# Patient Record
Sex: MALE | Race: BLACK OR AFRICAN AMERICAN | Employment: UNEMPLOYED | URBAN - METROPOLITAN AREA
[De-identification: names, ages, dates, MRNs, and addresses within clinical notes are randomized per-mention and may not be internally consistent; named-entity substitution may affect disease eponyms.]

---

## 2018-08-02 ENCOUNTER — HOSPITAL ENCOUNTER (EMERGENCY)
Age: 28
Discharge: HOME | End: 2018-08-02
Attending: EMERGENCY MEDICINE
Payer: SELF-PAY

## 2018-08-02 VITALS
DIASTOLIC BLOOD PRESSURE: 81 MMHG | WEIGHT: 193 LBS | OXYGEN SATURATION: 96 % | RESPIRATION RATE: 12 BRPM | HEART RATE: 74 BPM | BODY MASS INDEX: 28.58 KG/M2 | TEMPERATURE: 97.9 F | SYSTOLIC BLOOD PRESSURE: 133 MMHG | HEIGHT: 69 IN

## 2018-08-02 DIAGNOSIS — N34.2 URETHRITIS: Primary | ICD-10-CM

## 2018-08-02 PROCEDURE — 99283 EMERGENCY DEPT VISIT LOW MDM: CPT

## 2018-08-02 PROCEDURE — 87491 CHLMYD TRACH DNA AMP PROBE: CPT | Performed by: EMERGENCY MEDICINE

## 2018-08-02 PROCEDURE — 74011250636 HC RX REV CODE- 250/636: Performed by: EMERGENCY MEDICINE

## 2018-08-02 PROCEDURE — 96372 THER/PROPH/DIAG INJ SC/IM: CPT

## 2018-08-02 PROCEDURE — 74011250637 HC RX REV CODE- 250/637: Performed by: EMERGENCY MEDICINE

## 2018-08-02 RX ORDER — AZITHROMYCIN 500 MG/1
1000 TABLET, FILM COATED ORAL
Status: COMPLETED | OUTPATIENT
Start: 2018-08-02 | End: 2018-08-02

## 2018-08-02 RX ADMIN — LIDOCAINE HYDROCHLORIDE 1 G: 10 INJECTION, SOLUTION EPIDURAL; INFILTRATION; INTRACAUDAL; PERINEURAL at 23:31

## 2018-08-02 RX ADMIN — AZITHROMYCIN 1000 MG: 500 TABLET, FILM COATED ORAL at 23:31
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
## 2018-08-03 NOTE — ED NOTES
Discharge instructions were given to the patient by Adama Lee RN. The patient left the Emergency Department ambulatory, alert and oriented and in no acute distress with 0 prescriptions. The patient was encouraged to call or return to the ED for worsening issues or problems and was encouraged to schedule a follow up appointment for continuing care. The patient verbalized understanding of discharge instructions and prescriptions, all questions were answered. The patient has no further concerns at this time.

## 2018-08-03 NOTE — DISCHARGE INSTRUCTIONS
Urethritis: Care Instructions  Your Care Instructions    Urethritis is an infection of the tube that takes urine from the bladder to the outside of the body. This tube is called the urethra. The infection is often caused by bacteria. This can happen if you have a sexually transmitted infection (STI). But a virus may also be a cause. Urethritis is usually treated with antibiotics. Most cases clear up with treatment. Proper treatment is very important. If you don't treat it, the infection can lead to lasting damage of the urethra. Other parts of the urinary system can also be damaged. Follow-up care is a key part of your treatment and safety. Be sure to make and go to all appointments, and call your doctor if you are having problems. It's also a good idea to know your test results and keep a list of the medicines you take. How can you care for yourself at home? · If your doctor prescribed antibiotics, take them as directed. Do not stop taking them just because you feel better. You need to take the full course of antibiotics. · Take an over-the-counter pain medicine, such as acetaminophen (Tylenol), ibuprofen (Advil, Motrin), or naproxen (Aleve), if needed. Be safe with medicines. Read and follow all instructions on the label. · Do not take two or more pain medicines at the same time unless the doctor told you to. Many pain medicines have acetaminophen, which is Tylenol. Too much acetaminophen (Tylenol) can be harmful. · Your doctor may have you take phenazopyridine (Pyridium). This is a pain medicine for the urinary tract. It can turn your urine a deep red-orange. This is normal. Call your doctor if you think you are having a problem with your medicine. · Do not have sex until you are done with treatment. If you do have sex, be sure to use a condom. Your sex partner or partners should be tested too if your urethritis was caused by an STI.   · If your infection was caused by an injury or chemicals, avoid those things if you can. When should you call for help? Call your doctor now or seek immediate medical care if:    · You can't urinate.     · You have symptoms of a urinary infection. For example:  ¨ You have blood or pus in your urine. ¨ You have pain in your back just below your rib cage. This is called flank pain. ¨ You have a fever, chills, or body aches. ¨ It hurts to urinate. ¨ You have groin or belly pain.     · You have a hard time urinating when your bladder is full.     · You notice mental changes or feel confused.    Watch closely for changes in your health, and be sure to contact your doctor if:    · You do not get better as expected. Where can you learn more? Go to http://aminata-kenneth.info/. Enter M462 in the search box to learn more about \"Urethritis: Care Instructions. \"  Current as of: May 12, 2017  Content Version: 11.7  © 7418-0287 [employer]. Care instructions adapted under license by [employer] (which disclaims liability or warranty for this information). If you have questions about a medical condition or this instruction, always ask your healthcare professional. [address] any warranty or liability for your use of this information.
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
## 2018-08-06 LAB
C TRACH DNA SPEC QL NAA+PROBE: NEGATIVE
N GONORRHOEA DNA SPEC QL NAA+PROBE: POSITIVE
SAMPLE TYPE: ABNORMAL
SERVICE CMNT-IMP: ABNORMAL
SPECIMEN SOURCE: ABNORMAL

## 2018-08-31 ENCOUNTER — APPOINTMENT (OUTPATIENT)
Dept: GENERAL RADIOLOGY | Age: 28
End: 2018-08-31
Attending: PHYSICIAN ASSISTANT
Payer: SELF-PAY

## 2018-08-31 ENCOUNTER — HOSPITAL ENCOUNTER (EMERGENCY)
Age: 28
Discharge: HOME | End: 2018-08-31
Attending: EMERGENCY MEDICINE
Payer: SELF-PAY

## 2018-08-31 VITALS
WEIGHT: 193 LBS | RESPIRATION RATE: 17 BRPM | SYSTOLIC BLOOD PRESSURE: 135 MMHG | TEMPERATURE: 98.5 F | HEIGHT: 69 IN | DIASTOLIC BLOOD PRESSURE: 82 MMHG | BODY MASS INDEX: 28.58 KG/M2 | HEART RATE: 94 BPM | OXYGEN SATURATION: 97 %
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
## 2018-08-31 DIAGNOSIS — T30.0 BURN: Primary | ICD-10-CM

## 2018-08-31 PROCEDURE — 74011000250 HC RX REV CODE- 250: Performed by: PHYSICIAN ASSISTANT

## 2018-08-31 PROCEDURE — 73590 X-RAY EXAM OF LOWER LEG: CPT

## 2018-08-31 PROCEDURE — 99283 EMERGENCY DEPT VISIT LOW MDM: CPT

## 2018-08-31 RX ORDER — BACITRACIN 500 UNIT/G
1 PACKET (EA) TOPICAL
Status: COMPLETED | OUTPATIENT
Start: 2018-08-31 | End: 2018-08-31

## 2018-08-31 RX ORDER — MUPIROCIN 20 MG/G
OINTMENT TOPICAL 3 TIMES DAILY
Qty: 22 G | Refills: 0 | Status: SHIPPED | OUTPATIENT
Start: 2018-08-31 | End: 2018-09-10

## 2018-08-31 RX ORDER — CEPHALEXIN 500 MG/1
500 CAPSULE ORAL 3 TIMES DAILY
Qty: 21 CAP | Refills: 0 | Status: SHIPPED | OUTPATIENT
Start: 2018-08-31 | End: 2018-09-07

## 2018-08-31 RX ADMIN — BACITRACIN 1 PACKET: 500 OINTMENT TOPICAL at 16:17
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
## 2018-10-07 ENCOUNTER — HOSPITAL ENCOUNTER (EMERGENCY)
Age: 28
Discharge: HOME | End: 2018-10-07
Attending: EMERGENCY MEDICINE | Admitting: EMERGENCY MEDICINE
Payer: SELF-PAY

## 2018-10-07 ENCOUNTER — APPOINTMENT (OUTPATIENT)
Dept: GENERAL RADIOLOGY | Age: 28
End: 2018-10-07
Payer: SELF-PAY

## 2018-10-07 VITALS
BODY MASS INDEX: 28.14 KG/M2 | RESPIRATION RATE: 18 BRPM | DIASTOLIC BLOOD PRESSURE: 77 MMHG | WEIGHT: 190 LBS | HEART RATE: 99 BPM | OXYGEN SATURATION: 97 % | TEMPERATURE: 98.3 F | SYSTOLIC BLOOD PRESSURE: 130 MMHG | HEIGHT: 69 IN
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
## 2018-10-07 DIAGNOSIS — S80.11XA CONTUSION OF RIGHT LOWER EXTREMITY, INITIAL ENCOUNTER: ICD-10-CM

## 2018-10-07 DIAGNOSIS — S89.91XA INJURY OF RIGHT LOWER EXTREMITY, INITIAL ENCOUNTER: ICD-10-CM

## 2018-10-07 PROCEDURE — 99283 EMERGENCY DEPT VISIT LOW MDM: CPT

## 2018-10-07 PROCEDURE — 73590 X-RAY EXAM OF LOWER LEG: CPT

## 2018-10-07 PROCEDURE — 73610 X-RAY EXAM OF ANKLE: CPT

## 2018-10-07 PROCEDURE — 74011250637 HC RX REV CODE- 250/637: Performed by: PHYSICIAN ASSISTANT

## 2018-10-07 RX ORDER — ACETAMINOPHEN 500 MG
1000 TABLET ORAL
Status: COMPLETED | OUTPATIENT
Start: 2018-10-07 | End: 2018-10-07

## 2018-10-07 RX ORDER — TRAMADOL HYDROCHLORIDE 50 MG/1
50 TABLET ORAL
Qty: 12 TAB | Refills: 0 | Status: SHIPPED | OUTPATIENT
Start: 2018-10-07 | End: 2018-10-23

## 2018-10-07 RX ADMIN — ACETAMINOPHEN 1000 MG: 500 TABLET, FILM COATED ORAL at 20:01
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
## 2018-10-08 NOTE — ED NOTES
Discharge instructions were given to the patient by Hilda Pryor.  
 
The patient left the Emergency Department ambulatory, alert and oriented and in no acute distress with 1 prescription. The patient was encouraged to call or return to the ED for worsening issues or problems and was encouraged to schedule a follow up appointment for continuing care. The patient verbalized understanding of discharge instructions and prescriptions, all questions were answered. The patient has no further concerns at this time.

## 2018-10-08 NOTE — ED NOTES
This RN assumes role as preceptor to Danisha Owen RN. This RN reviews all assessments, charting, medication administration, and skills performed by the preceptee.

## 2018-10-08 NOTE — DISCHARGE INSTRUCTIONS

## 2018-10-21 ENCOUNTER — APPOINTMENT (OUTPATIENT)
Dept: GENERAL RADIOLOGY | Age: 28
End: 2018-10-21
Attending: EMERGENCY MEDICINE
Payer: SELF-PAY

## 2018-10-21 ENCOUNTER — HOSPITAL ENCOUNTER (EMERGENCY)
Age: 28
Discharge: HOME | End: 2018-10-21
Attending: EMERGENCY MEDICINE
Payer: SELF-PAY

## 2018-10-21 VITALS
RESPIRATION RATE: 14 BRPM | SYSTOLIC BLOOD PRESSURE: 156 MMHG | BODY MASS INDEX: 28.14 KG/M2 | HEART RATE: 74 BPM | WEIGHT: 190 LBS | DIASTOLIC BLOOD PRESSURE: 91 MMHG | OXYGEN SATURATION: 98 % | HEIGHT: 69 IN | TEMPERATURE: 97.7 F

## 2018-10-21 DIAGNOSIS — S80.11XA LEG HEMATOMA, RIGHT, INITIAL ENCOUNTER: Primary | ICD-10-CM

## 2018-10-21 PROCEDURE — 74011250637 HC RX REV CODE- 250/637: Performed by: EMERGENCY MEDICINE

## 2018-10-21 PROCEDURE — 75810000289 HC I&D ABSCESS SIMP/COMP/MULT

## 2018-10-21 PROCEDURE — 99283 EMERGENCY DEPT VISIT LOW MDM: CPT

## 2018-10-21 PROCEDURE — 73590 X-RAY EXAM OF LOWER LEG: CPT

## 2018-10-21 PROCEDURE — 74011250636 HC RX REV CODE- 250/636: Performed by: EMERGENCY MEDICINE

## 2018-10-21 PROCEDURE — 90471 IMMUNIZATION ADMIN: CPT

## 2018-10-21 PROCEDURE — 90714 TD VACC NO PRESV 7 YRS+ IM: CPT | Performed by: EMERGENCY MEDICINE

## 2018-10-21 RX ORDER — TRAMADOL HYDROCHLORIDE 50 MG/1
50 TABLET ORAL
Status: COMPLETED | OUTPATIENT
Start: 2018-10-21 | End: 2018-10-21

## 2018-10-21 RX ORDER — SULFAMETHOXAZOLE AND TRIMETHOPRIM 800; 160 MG/1; MG/1
1 TABLET ORAL 2 TIMES DAILY
Qty: 14 TAB | Refills: 0 | Status: SHIPPED | OUTPATIENT
Start: 2018-10-21 | End: 2018-10-28

## 2018-10-21 RX ORDER — SULFAMETHOXAZOLE AND TRIMETHOPRIM 800; 160 MG/1; MG/1
1 TABLET ORAL
Status: COMPLETED | OUTPATIENT
Start: 2018-10-21 | End: 2018-10-21

## 2018-10-21 RX ORDER — LIDOCAINE HYDROCHLORIDE 10 MG/ML
3 INJECTION, SOLUTION EPIDURAL; INFILTRATION; INTRACAUDAL; PERINEURAL ONCE
Status: COMPLETED | OUTPATIENT
Start: 2018-10-21 | End: 2018-10-21

## 2018-10-21 RX ORDER — HYDROCODONE BITARTRATE AND ACETAMINOPHEN 5; 325 MG/1; MG/1
1 TABLET ORAL
Qty: 20 TAB | Refills: 0 | Status: SHIPPED | OUTPATIENT
Start: 2018-10-21 | End: 2018-10-23

## 2018-10-21 RX ADMIN — TRAMADOL HYDROCHLORIDE 50 MG: 50 TABLET, FILM COATED ORAL at 08:25

## 2018-10-21 RX ADMIN — CLOSTRIDIUM TETANI TOXOID ANTIGEN (FORMALDEHYDE INACTIVATED) AND CORYNEBACTERIUM DIPHTHERIAE TOXOID ANTIGEN (FORMALDEHYDE INACTIVATED) 0.5 ML: 5; 2 INJECTION, SUSPENSION INTRAMUSCULAR at 08:26

## 2018-10-21 RX ADMIN — LIDOCAINE HYDROCHLORIDE 3 ML: 10 INJECTION, SOLUTION EPIDURAL; INFILTRATION; INTRACAUDAL; PERINEURAL at 08:10

## 2018-10-21 RX ADMIN — SULFAMETHOXAZOLE AND TRIMETHOPRIM 1 TABLET: 800; 160 TABLET ORAL at 08:25

## 2018-10-21 NOTE — ED NOTES
..Patient has been instructed that they have been given tramadol* which contains opioids, benzodiazepines, or other sedating drugs. Patient is aware that they  will need to refrain from driving or operating heavy machinery after taking this medication. Patient also instructed that they need to avoid drinking alcohol and using other products containing opioids, benzodiazepines, or other sedating drugs. Patient verbalized understanding. .. Dorie Palma Discharge summary and discharge medications reviewed with patient and appropriate educational materials and side effects teaching were provided. patient  Given 1 paper prescriptions and 1 electronic prescriptions sent to pt's listed pharmacy. Patient (s) verbalized understanding of the importance of discussing medications with his or her physician or clinic they will be following up with. No si/s of acute distress prior to discharge. Patient offered wheelchair from treatment area to hospital entrance, patient refused wheelchair. C/o 9/10 left foot pain, tolerable for discharge, nonverbal pain of 1/10. Steady gait with pt's family member with crutches.

## 2018-10-21 NOTE — DISCHARGE INSTRUCTIONS
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
## 2018-10-23 ENCOUNTER — HOSPITAL ENCOUNTER (EMERGENCY)
Age: 28
Discharge: HOME | End: 2018-10-23
Attending: EMERGENCY MEDICINE
Payer: SELF-PAY

## 2018-10-23 VITALS
HEART RATE: 84 BPM | DIASTOLIC BLOOD PRESSURE: 94 MMHG | OXYGEN SATURATION: 96 % | BODY MASS INDEX: 26.34 KG/M2 | RESPIRATION RATE: 17 BRPM | HEIGHT: 70 IN | SYSTOLIC BLOOD PRESSURE: 150 MMHG | TEMPERATURE: 98.2 F | WEIGHT: 184 LBS

## 2018-10-23 DIAGNOSIS — Z51.89 ENCOUNTER FOR WOUND RE-CHECK: Primary | ICD-10-CM

## 2018-10-23 DIAGNOSIS — S80.11XA LEG HEMATOMA, RIGHT, INITIAL ENCOUNTER: ICD-10-CM

## 2018-10-23 PROCEDURE — 99283 EMERGENCY DEPT VISIT LOW MDM: CPT

## 2018-10-23 PROCEDURE — 74011250637 HC RX REV CODE- 250/637: Performed by: EMERGENCY MEDICINE

## 2018-10-23 RX ORDER — HYDROCODONE BITARTRATE AND ACETAMINOPHEN 5; 325 MG/1; MG/1
1 TABLET ORAL
Qty: 6 TAB | Refills: 0 | Status: SHIPPED | OUTPATIENT
Start: 2018-10-23 | End: 2019-06-24

## 2018-10-23 RX ORDER — HYDROCODONE BITARTRATE AND ACETAMINOPHEN 5; 325 MG/1; MG/1
1 TABLET ORAL
Status: COMPLETED | OUTPATIENT
Start: 2018-10-23 | End: 2018-10-23

## 2018-10-23 RX ADMIN — HYDROCODONE BITARTRATE AND ACETAMINOPHEN 1 TABLET: 5; 325 TABLET ORAL at 10:01
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
## 2018-10-23 NOTE — DISCHARGE INSTRUCTIONS
Wound Care: After Your Visit  Your Care Instructions  Taking good care of your wound at home will help it heal quickly and reduce your chance of infection. The doctor has checked you carefully, but problems can develop later. If you notice any problems or new symptoms, get medical treatment right away. Follow-up care is a key part of your treatment and safety. Be sure to make and go to all appointments, and call your doctor if you are having problems. It's also a good idea to know your test results and keep a list of the medicines you take. How can you care for yourself at home? · Clean the area with soap and water 2 times a day unless your doctor gives you different instructions. Don't use hydrogen peroxide or alcohol, which can slow healing. ¨ You may cover the wound with a thin layer of antibiotic ointment, such as bacitracin, and a nonstick bandage. ¨ Apply more ointment and replace the bandage as needed. · Take pain medicines exactly as directed. Some pain is normal with a wound, but do not ignore pain that is getting worse instead of better. You could have an infection. ¨ If the doctor gave you a prescription medicine for pain, take it as prescribed. ¨ If you are not taking a prescription pain medicine, ask your doctor if you can take an over-the-counter medicine. · Your doctor may have closed your wound with stitches (sutures), staples, or skin glue. ¨ If you have stitches, your doctor may remove them after several days to 2 weeks. Or you may have stitches that dissolve on their own. ¨ If you have staples, your doctor may remove them after 7 to 10 days. ¨ If your wound was closed with skin glue, the glue will wear off in a few days to 2 weeks. When should you call for help? Call your doctor now or seek immediate medical care if:  · You have signs of infection, such as:  ¨ Increased pain, swelling, warmth, or redness near the wound. ¨ Red streaks leading from the wound.   ¨ Pus draining from the wound. ¨ A fever. · You bleed so much from your incision that you soak one or more bandages over 2 to 4 hours. Watch closely for changes in your health, and be sure to contact your doctor if:  · The wound is not getting better each day. Where can you learn more? Go to [employer]  Enter M973 in the search box to learn more about \"Wound Care: After Your Visit. \"   © 2032-8514 [employer]. Care instructions adapted under license by [facility] (which disclaims liability or warranty for this information). This care instruction is for use with your licensed healthcare professional. If you have questions about a medical condition or this instruction, always ask your healthcare professional. [address] any warranty or liability for your use of this information. Content Version: 71.4.905451;  Last Revised: April 23, 2012
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
## 2018-10-23 NOTE — ED TRIAGE NOTES
Patient reports I and d of right lower leg abscess on Sunday. States told to [facility] back today\" for wound check

## 2018-10-23 NOTE — ED NOTES
Patient (s)  given copy of dc instructions and  paper script(s) and 1 electronic scripts. Patient (s)  verbalized understanding of instructions and script (s). Patient given a current medication reconciliation form and verbalized understanding of their medications. Patient (s) verbalized understanding of the importance of discussing medications with  his or her physician or clinic they will be following up with. Patient alert and oriented and in no acute distress. Patient offered wheelchair from treatment area to hospital entrance, patient decline wheelchair.

## 2018-10-25 ENCOUNTER — HOSPITAL ENCOUNTER (EMERGENCY)
Age: 28
Discharge: HOME | End: 2018-10-25
Attending: EMERGENCY MEDICINE
Payer: SELF-PAY

## 2018-10-25 VITALS
OXYGEN SATURATION: 96 % | HEART RATE: 95 BPM | BODY MASS INDEX: 26.96 KG/M2 | TEMPERATURE: 98 F | SYSTOLIC BLOOD PRESSURE: 137 MMHG | RESPIRATION RATE: 18 BRPM | HEIGHT: 69 IN | WEIGHT: 182 LBS | DIASTOLIC BLOOD PRESSURE: 86 MMHG

## 2018-10-25 DIAGNOSIS — Z48.00 CHANGE OR REMOVAL OF WOUND PACKING: Primary | ICD-10-CM

## 2018-10-25 DIAGNOSIS — S80.11XA HEMATOMA OF RIGHT LOWER EXTREMITY, INITIAL ENCOUNTER: ICD-10-CM

## 2018-10-25 PROCEDURE — 99283 EMERGENCY DEPT VISIT LOW MDM: CPT

## 2018-10-25 NOTE — ED PROVIDER NOTES
EMERGENCY DEPARTMENT HISTORY AND PHYSICAL EXAM 
 
 
Date: 10/25/2018 Patient Name: Jennyfer Tran History of Presenting Illness Chief Complaint Patient presents with  Wound Check  
  right leg History Provided By: Patient HPI: Jennyfer Tran, 32 y.o. male with no significant PMHx, presents ambulatory to the ED with cc for wound re-check. Pt states he had hematoma drained and packed on 10/21 and re-packed again on 10/23. Has not changed dressing by himself. Rates pain 10/10. Denies drainage, fever/chills. Has been taking norco for pain with moderate relief. There are no other complaints, changes, or physical findings at this time. PCP: Kiko Echevarria MD 
 
Current Outpatient Medications Medication Sig Dispense Refill  
 HYDROcodone-acetaminophen (NORCO) 5-325 mg per tablet Take 1 Tab by mouth every four (4) hours as needed for Pain. Max Daily Amount: 6 Tabs. 6 Tab 0  
 trimethoprim-sulfamethoxazole (BACTRIM DS) 160-800 mg per tablet Take 1 Tab by mouth two (2) times a day for 7 days. 14 Tab 0 Past History Past Medical History: 
History reviewed. No pertinent past medical history. Past Surgical History: 
History reviewed. No pertinent surgical history. Family History: 
History reviewed. No pertinent family history. Social History: 
Social History Tobacco Use  Smoking status: Never Smoker  Smokeless tobacco: Never Used Substance Use Topics  Alcohol use: Yes Comment: rarely  Drug use: Yes Types: Marijuana Comment: 2-4 \"blunts\" a day Allergies: 
No Known Allergies Review of Systems Review of Systems Constitutional: Negative for chills and fever. HENT: Negative for facial swelling. Eyes: Negative for photophobia and visual disturbance. Respiratory: Negative for shortness of breath. Cardiovascular: Negative for chest pain. Gastrointestinal: Negative for abdominal pain, nausea and vomiting. Genitourinary: Negative for flank pain. Skin: Positive for wound (right lower leg). Negative for color change, pallor and rash. Neurological: Negative for dizziness, weakness, light-headedness and headaches. All other systems reviewed and are negative. Physical Exam  
Physical Exam  
Constitutional: He is oriented to person, place, and time. He appears well-developed and well-nourished. No distress. HENT:  
Head: Normocephalic and atraumatic. Eyes: Conjunctivae are normal.  
Cardiovascular: Normal rate, regular rhythm and normal heart sounds. Pulmonary/Chest: Effort normal and breath sounds normal. No respiratory distress. Abdominal: Soft. Bowel sounds are normal. He exhibits no distension. Musculoskeletal: Normal range of motion. Neurological: He is alert and oriented to person, place, and time. Skin: Skin is warm. No rash noted. 1 cm wound to right lower leg with packing in place. Indurated. No surrounding fluctuance or drainage. TTP. Psychiatric: He has a normal mood and affect. His behavior is normal.  
Nursing note and vitals reviewed. Diagnostic Study Results Labs - No results found for this or any previous visit (from the past 12 hour(s)). Radiologic Studies - No orders to display CT Results  (Last 48 hours) None CXR Results  (Last 48 hours) None Medical Decision Making I am the first provider for this patient. I reviewed the vital signs, available nursing notes, past medical history, past surgical history, family history and social history. Vital Signs-Reviewed the patient's vital signs. Patient Vitals for the past 12 hrs: 
 Temp Pulse Resp BP SpO2  
10/25/18 1218  95  137/86   
10/25/18 1155 98 °F (36.7 °C) 94 18 (!) 143/97 96 % Records Reviewed: Nursing Notes and Old Medical Records Provider Notes (Medical Decision Making): DDx: wound re-check, cellulitis, hematoma ED Course: Initial assessment performed. The patients presenting problems have been discussed, and they are in agreement with the care plan formulated and outlined with them. I have encouraged them to ask questions as they arise throughout their visit. Wound re-packing Date/Time: 10/25/2018 12:34 PM 
Performed by: ADAM Mcfadden Authorized by: ADAM Mcfadden Consent:  
  Consent obtained:  Verbal 
  Consent given by:  Patient Risks discussed:  Bleeding, infection and pain Alternatives discussed:  No treatment Anesthesia (see MAR for exact dosages): Anesthesia method:  None Post-procedure details:  
  Patient tolerance of procedure: Tolerated well, no immediate complications Comments:  
   About 12 inches of packing with serosanguinous fluids removed from wound using forceps. About 12 inches of 1/4\" packing used to re-pack wound. Pt tolerated procedure well. Disposition: 
12:33 PM 
Discussed  dx and treatment plan. Discussed importance of  PCP and outpatient wound care follow up. All questions answered. Pt voiced they understood. Return if sx worsen. PLAN: 
1. Current Discharge Medication List  
  
CONTINUE these medications which have NOT CHANGED Details HYDROcodone-acetaminophen (NORCO) 5-325 mg per tablet Take 1 Tab by mouth every four (4) hours as needed for Pain. Max Daily Amount: 6 Tabs. Qty: 6 Tab, Refills: 0 Associated Diagnoses: Encounter for wound re-check  
  
trimethoprim-sulfamethoxazole (BACTRIM DS) 160-800 mg per tablet Take 1 Tab by mouth two (2) times a day for 7 days. Qty: 14 Tab, Refills: 0  
  
  
 
2. Follow-up Information Follow up With Specialties Details Why Contact Info Rod Worthington MD Pediatrics Schedule an appointment as soon as possible for a visit in 2 days  [address] [place] [phone] 
[phone]     [facility] wound clinic will be calling you for further outpatient wound management Return to ED if worse Diagnosis Clinical Impression: 1. Change or removal of wound packing 2. Hematoma of right lower extremity, initial encounter

## 2018-10-25 NOTE — DISCHARGE INSTRUCTIONS
Hematoma: Care Instructions  Your Care Instructions    A hematoma is a bad bruise. It happens when an injury causes blood to collect and pool under the skin. The pooling blood gives the skin a spongy, rubbery, lumpy feel. A hematoma usually is not a cause for concern. It is not the same thing as a blood clot in a vein, and it does not cause blood clots. Follow-up care is a key part of your treatment and safety. Be sure to make and go to all appointments, and call your doctor if you are having problems. It's also a good idea to know your test results and keep a list of the medicines you take. How can you care for yourself at home? · Rest and protect the bruised area. · Put ice or a cold pack on the area for 10 to 20 minutes at a time. · Prop up the bruised area on a pillow when you ice it or anytime you sit or lie down during the next 3 days. Try to keep it above the level of your heart. This will help reduce swelling. · Wrapping the bruised area with an elastic bandage such as an Ace wrap will help decrease swelling. Don't wrap it too tightly, as this can cause more swelling below the affected area. · Take an over-the-counter pain medicine, such as acetaminophen (Tylenol), ibuprofen (Advil, Motrin), or naproxen (Aleve). · Do not take two or more pain medicines at the same time unless the doctor told you to. Many pain medicines have acetaminophen, which is Tylenol. Too much acetaminophen (Tylenol) can be harmful. When should you call for help? Call your doctor now or seek immediate medical care if:    · You have signs of skin infection, such as:  ? Increased pain, swelling, warmth, or redness. ? Red streaks leading from the area. ? Pus draining from the area. ? A fever.    Watch closely for changes in your health, and be sure to contact your doctor if:    · The bruise lasts longer than 4 weeks.     · The bruise gets bigger or becomes more painful.     · You do not get better as expected.    Where can you learn more? Go to http://aminata-kenneth.info/. Enter P911 in the search box to learn more about \"Hematoma: Care Instructions. \"  Current as of: November 20, 2017  Content Version: 11.8  © 2292-3423 [employer]. Care instructions adapted under license by [employer] (which disclaims liability or warranty for this information). If you have questions about a medical condition or this instruction, always ask your healthcare professional. [place] [address] any warranty or liability for your use of this information.

## 2018-10-25 NOTE — ED NOTES
[facility], [address], ED case manager, was notified of pt's status-that he may need f/u for wound care/packing.

## 2018-10-25 NOTE — ED NOTES
Pt discharged by provider with a steady gait with crutches without s/si of acute distress, nonverbal pain of 1/10.
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
## 2018-10-25 NOTE — ED NOTES
.. 
Emergency Department Nursing Plan of Care The Nursing Plan of Care is developed from the Nursing assessment and Emergency Department Attending provider initial evaluation. The plan of care may be reviewed in the ED Provider note. The Plan of Care was developed with the following considerations:  
Patient / Family readiness to learn indicated by:verbalized understanding and appropriate questions asked Persons(s) to be included in education: patient Barriers to Learning/Limitations:No 
 
Signed Isabella Fischer RN   
10/25/2018   12:21 PM

## 2018-10-27 ENCOUNTER — HOSPITAL ENCOUNTER (EMERGENCY)
Age: 28
Discharge: HOME | End: 2018-10-27
Attending: EMERGENCY MEDICINE
Payer: SELF-PAY

## 2018-10-27 VITALS
OXYGEN SATURATION: 96 % | HEART RATE: 78 BPM | HEIGHT: 69 IN | RESPIRATION RATE: 16 BRPM | DIASTOLIC BLOOD PRESSURE: 95 MMHG | SYSTOLIC BLOOD PRESSURE: 146 MMHG | TEMPERATURE: 97.7 F | WEIGHT: 182 LBS | BODY MASS INDEX: 26.96 KG/M2

## 2018-10-27 DIAGNOSIS — T50.905A MEDICATION SIDE EFFECT, INITIAL ENCOUNTER: ICD-10-CM

## 2018-10-27 DIAGNOSIS — R11.2 NON-INTRACTABLE VOMITING WITH NAUSEA, UNSPECIFIED VOMITING TYPE: ICD-10-CM

## 2018-10-27 DIAGNOSIS — Z48.00 CHANGE OR REMOVAL OF WOUND PACKING: Primary | ICD-10-CM

## 2018-10-27 LAB
ALBUMIN SERPL-MCNC: 4.2 G/DL (ref 3.5–5)
ALBUMIN/GLOB SERPL: 1 {RATIO} (ref 1.1–2.2)
ALP SERPL-CCNC: 83 U/L (ref 45–117)
ALT SERPL-CCNC: 16 U/L (ref 12–78)
ANION GAP SERPL CALC-SCNC: 9 MMOL/L (ref 5–15)
AST SERPL-CCNC: 23 U/L (ref 15–37)
BASOPHILS # BLD: 0 K/UL (ref 0–0.1)
BASOPHILS NFR BLD: 0 % (ref 0–1)
BILIRUB SERPL-MCNC: 0.6 MG/DL (ref 0.2–1)
BUN SERPL-MCNC: 11 MG/DL (ref 6–20)
BUN/CREAT SERPL: 9 (ref 12–20)
CALCIUM SERPL-MCNC: 9.6 MG/DL (ref 8.5–10.1)
CHLORIDE SERPL-SCNC: 99 MMOL/L (ref 97–108)
CO2 SERPL-SCNC: 29 MMOL/L (ref 21–32)
CREAT SERPL-MCNC: 1.19 MG/DL (ref 0.7–1.3)
DIFFERENTIAL METHOD BLD: ABNORMAL
EOSINOPHIL # BLD: 0.2 K/UL (ref 0–0.4)
EOSINOPHIL NFR BLD: 4 % (ref 0–7)
ERYTHROCYTE [DISTWIDTH] IN BLOOD BY AUTOMATED COUNT: 11.6 % (ref 11.5–14.5)
GLOBULIN SER CALC-MCNC: 4.3 G/DL (ref 2–4)
GLUCOSE SERPL-MCNC: 134 MG/DL (ref 65–100)
HCT VFR BLD AUTO: 45.7 % (ref 36.6–50.3)
HGB BLD-MCNC: 15.7 G/DL (ref 12.1–17)
IMM GRANULOCYTES # BLD: 0 K/UL (ref 0–0.04)
IMM GRANULOCYTES NFR BLD AUTO: 0 % (ref 0–0.5)
LYMPHOCYTES # BLD: 1.1 K/UL (ref 0.8–3.5)
LYMPHOCYTES NFR BLD: 24 % (ref 12–49)
MCH RBC QN AUTO: 29.3 PG (ref 26–34)
MCHC RBC AUTO-ENTMCNC: 34.4 G/DL (ref 30–36.5)
MCV RBC AUTO: 85.3 FL (ref 80–99)
MONOCYTES # BLD: 0.6 K/UL (ref 0–1)
MONOCYTES NFR BLD: 14 % (ref 5–13)
NEUTS SEG # BLD: 2.6 K/UL (ref 1.8–8)
NEUTS SEG NFR BLD: 58 % (ref 32–75)
NRBC # BLD: 0 K/UL (ref 0–0.01)
NRBC BLD-RTO: 0 PER 100 WBC
PLATELET # BLD AUTO: 224 K/UL (ref 150–400)
PMV BLD AUTO: 10.8 FL (ref 8.9–12.9)
POTASSIUM SERPL-SCNC: 3.7 MMOL/L (ref 3.5–5.1)
PROT SERPL-MCNC: 8.5 G/DL (ref 6.4–8.2)
RBC # BLD AUTO: 5.36 M/UL (ref 4.1–5.7)
SODIUM SERPL-SCNC: 137 MMOL/L (ref 136–145)
WBC # BLD AUTO: 4.5 K/UL (ref 4.1–11.1)

## 2018-10-27 PROCEDURE — 36415 COLL VENOUS BLD VENIPUNCTURE: CPT | Performed by: NURSE PRACTITIONER

## 2018-10-27 PROCEDURE — 74011250636 HC RX REV CODE- 250/636: Performed by: NURSE PRACTITIONER

## 2018-10-27 PROCEDURE — 99282 EMERGENCY DEPT VISIT SF MDM: CPT

## 2018-10-27 PROCEDURE — 96361 HYDRATE IV INFUSION ADD-ON: CPT

## 2018-10-27 PROCEDURE — 96374 THER/PROPH/DIAG INJ IV PUSH: CPT

## 2018-10-27 PROCEDURE — 80053 COMPREHEN METABOLIC PANEL: CPT | Performed by: NURSE PRACTITIONER

## 2018-10-27 PROCEDURE — 85025 COMPLETE CBC W/AUTO DIFF WBC: CPT | Performed by: NURSE PRACTITIONER

## 2018-10-27 RX ORDER — ONDANSETRON 2 MG/ML
4 INJECTION INTRAMUSCULAR; INTRAVENOUS
Status: COMPLETED | OUTPATIENT
Start: 2018-10-27 | End: 2018-10-27

## 2018-10-27 RX ORDER — ONDANSETRON 4 MG/1
4 TABLET, ORALLY DISINTEGRATING ORAL
Qty: 12 TAB | Refills: 0 | Status: SHIPPED | OUTPATIENT
Start: 2018-10-27 | End: 2019-06-24

## 2018-10-27 RX ADMIN — SODIUM CHLORIDE 1000 ML: 900 INJECTION, SOLUTION INTRAVENOUS at 20:48

## 2018-10-27 RX ADMIN — ONDANSETRON 4 MG: 2 INJECTION INTRAMUSCULAR; INTRAVENOUS at 20:48

## 2018-10-28 NOTE — DISCHARGE INSTRUCTIONS
Nausea and Vomiting: Care Instructions  Your Care Instructions    When you are nauseated, you may feel weak and sweaty and notice a lot of saliva in your mouth. Nausea often leads to vomiting. Most of the time you do not need to worry about nausea and vomiting, but they can be signs of other illnesses. Two common causes of nausea and vomiting are stomach flu and food poisoning. Nausea and vomiting from viral stomach flu will usually start to improve within 24 hours. Nausea and vomiting from food poisoning may last from 12 to 48 hours. The doctor has checked you carefully, but problems can develop later. If you notice any problems or new symptoms, get medical treatment right away. Follow-up care is a key part of your treatment and safety. Be sure to make and go to all appointments, and call your doctor if you are having problems. It's also a good idea to know your test results and keep a list of the medicines you take. How can you care for yourself at home? · To prevent dehydration, drink plenty of fluids, enough so that your urine is light yellow or clear like water. Choose water and other caffeine-free clear liquids until you feel better. If you have kidney, heart, or liver disease and have to limit fluids, talk with your doctor before you increase the amount of fluids you drink. · Rest in bed until you feel better. · When you are able to eat, try clear soups, mild foods, and liquids until all symptoms are gone for 12 to 48 hours. Other good choices include dry toast, crackers, cooked cereal, and gelatin dessert, such as Jell-O. When should you call for help? Call 911 anytime you think you may need emergency care. For example, call if:    · You passed out (lost consciousness).    Call your doctor now or seek immediate medical care if:    · You have symptoms of dehydration, such as:  ? Dry eyes and a dry mouth. ? Passing only a little dark urine. ?  Feeling thirstier than usual.     · You have new or worsening belly pain.     · You have a new or higher fever.     · You vomit blood or what looks like coffee grounds.    Watch closely for changes in your health, and be sure to contact your doctor if:    · You have ongoing nausea and vomiting.     · Your vomiting is getting worse.     · Your vomiting lasts longer than 2 days.     · You are not getting better as expected. Where can you learn more? Go to http://aminata-kenneth.info/. Enter 25 [id number] in the search box to learn more about \"Nausea and Vomiting: Care Instructions. \"  Current as of: November 20, 2017  Content Version: 11.8  © 2325-5034 [employer]. Care instructions adapted under license by [employer] (which disclaims liability or warranty for this information). If you have questions about a medical condition or this instruction, always ask your healthcare professional. [address] any warranty or liability for your use of this information.
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
## 2018-10-28 NOTE — ED NOTES
Pt reports he is feeling much better. Provider aware. Pt given ginger ale and saltine cracker for PO challenge.

## 2018-10-28 NOTE — ED NOTES
Discharge instructions were given to the patient by Yvette Willis RN. The patient left the Emergency Department ambulatory, alert and oriented and in no acute distress with 1 prescription. The patient was encouraged to call or return to the ED for worsening issues or problems and was encouraged to schedule a follow up appointment for continuing care. The patient verbalized understanding of discharge instructions and prescriptions, all questions were answered. The patient has no further concerns at this time.
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
## 2018-10-29 NOTE — ED PROVIDER NOTES
EMERGENCY DEPARTMENT HISTORY AND PHYSICAL EXAM 
 
Date: 10/27/2018 Patient Name: Brent Flor History of Presenting Illness Chief Complaint Patient presents with  Leg Injury  Abdominal Pain History Provided By: Patient Chief Complaint: abdominal pain Duration: one day Days Timing:  Acute Location: generalized abdomen Quality: Aching Severity: 4 out of 10 Modifying Factors: none Associated Symptoms: nausea vomiting HPI: Brent Flor is a 32 y.o. male with a PMH of No significant past medical history who presents with abdominal pain. . States he thinks he started vomiting from taking norco for pain. for abscess which was drained. States he needs leg wound packing removed PCP: Cliff Cornejo MD 
 
Current Outpatient Medications Medication Sig Dispense Refill  ondansetron (ZOFRAN ODT) 4 mg disintegrating tablet Take 1 Tab by mouth every eight (8) hours as needed for Nausea. 12 Tab 0  
 HYDROcodone-acetaminophen (NORCO) 5-325 mg per tablet Take 1 Tab by mouth every four (4) hours as needed for Pain. Max Daily Amount: 6 Tabs. 6 Tab 0  
 trimethoprim-sulfamethoxazole (BACTRIM DS) 160-800 mg per tablet Take 1 Tab by mouth two (2) times a day for 7 days. 14 Tab 0 Past History Past Medical History: 
History reviewed. No pertinent past medical history. Past Surgical History: 
History reviewed. No pertinent surgical history. Family History: 
History reviewed. No pertinent family history. Social History: 
Social History Tobacco Use  Smoking status: Never Smoker  Smokeless tobacco: Never Used Substance Use Topics  Alcohol use: Yes Comment: rarely  Drug use: Yes Types: Marijuana Comment: 2-4 \"blunts\" a day Allergies: 
No Known Allergies Review of Systems Review of Systems Constitutional: Negative for chills, fatigue and fever. HENT: Negative for congestion and sore throat. Eyes: Negative for redness. Respiratory: Negative for cough, chest tightness and wheezing. Cardiovascular: Negative for chest pain. Gastrointestinal: Positive for nausea and vomiting. Negative for abdominal pain. Genitourinary: Negative for dysuria. Musculoskeletal: Negative for arthralgias, back pain, myalgias, neck pain and neck stiffness. Skin: Positive for wound. Negative for rash. Neurological: Negative for dizziness, syncope, weakness, light-headedness, numbness and headaches. Hematological: Negative for adenopathy. All other systems reviewed and are negative. Physical Exam  
 
Vitals:  
 10/27/18 2004 BP: (!) 146/95 Pulse: 78 Resp: 16 Temp: 97.7 °F (36.5 °C) SpO2: 96% Weight: 82.6 kg (182 lb) Height: 5' 9\" (1.753 m) Physical Exam  
Constitutional: He is oriented to person, place, and time. He appears well-developed and well-nourished. HENT:  
Head: Normocephalic and atraumatic. Right Ear: External ear normal.  
Mouth/Throat: Oropharynx is clear and moist.  
Eyes: Conjunctivae are normal. Right eye exhibits no discharge. Left eye exhibits no discharge. Neck: Normal range of motion. Neck supple. Cardiovascular: Normal rate, regular rhythm and normal heart sounds. Pulmonary/Chest: Effort normal and breath sounds normal. No respiratory distress. He has no wheezes. Abdominal: Soft. Bowel sounds are normal. There is no tenderness. Musculoskeletal: Normal range of motion. He exhibits no edema. Lymphadenopathy:  
  He has no cervical adenopathy. Neurological: He is alert and oriented to person, place, and time. No cranial nerve deficit. Skin: Skin is warm and dry. Healing wound post I and D no drainage no signs of infection packing removed Psychiatric: He has a normal mood and affect. His behavior is normal. Judgment and thought content normal.  
Nursing note and vitals reviewed. Diagnostic Study Results Labs - 
 No results found for this or any previous visit (from the past 12 hour(s)). Radiologic Studies - No orders to display CT Results  (Last 48 hours) None CXR Results  (Last 48 hours) None Medical Decision Making I am the first provider for this patient. I reviewed the vital signs, available nursing notes, past medical history, past surgical history, family history and social history. Vital Signs-Reviewed the patient's vital signs. Records Reviewed: Nursing Notes and Old Medical Records Disposition: 
Home No vomiting feels better tolerating oral liquids. DISCHARGE NOTE:  
 
 
  Care plan outlined and precautions discussed. Patient has no new complaints, changes, or physical findings. Results of tests were reviewed with the patient. All medications were reviewed with the patient; will d/c home with zofran. All of pt's questions and concerns were addressed. Patient was instructed and agrees to follow up with PCP, as well as to return to the ED upon further deterioration. Patient is ready to go home. Follow-up Information Follow up With Specialties Details Why Contact Info [facility]  In 2 days If symptoms worsen [address] [place] [address] [address] [place] [address] 
[phone] Discharge Medication List as of 10/27/2018 10:20 PM  
  
START taking these medications Details  
ondansetron (ZOFRAN ODT) 4 mg disintegrating tablet Take 1 Tab by mouth every eight (8) hours as needed for Nausea., Normal, Disp-12 Tab, R-0  
  
  
CONTINUE these medications which have NOT CHANGED Details HYDROcodone-acetaminophen (NORCO) 5-325 mg per tablet Take 1 Tab by mouth every four (4) hours as needed for Pain.  Max Daily Amount: 6 Tabs., Print, Disp-6 Tab, R-0  
  
trimethoprim-sulfamethoxazole (BACTRIM DS) 160-800 mg per tablet Take 1 Tab by mouth two (2) times a day for 7 days. , Normal, Disp-14 Tab, R-0 Provider Notes (Medical Decision Making): DDX medication reaction dehydration viral illness packing removal 
Procedures: 
Procedures Diagnosis Clinical Impression: 1. Change or removal of wound packing 2. Non-intractable vomiting with nausea, unspecified vomiting type 3. Medication side effect, initial encounter

## 2019-03-20 ENCOUNTER — HOSPITAL ENCOUNTER (EMERGENCY)
Age: 29
Discharge: STILL ADMITTED OR ADMINISTRATIVE | End: 2019-03-21
Attending: EMERGENCY MEDICINE
Payer: MEDICAID

## 2019-03-20 VITALS
WEIGHT: 193 LBS | RESPIRATION RATE: 14 BRPM | OXYGEN SATURATION: 96 % | BODY MASS INDEX: 27.63 KG/M2 | HEIGHT: 70 IN | TEMPERATURE: 100.3 F | SYSTOLIC BLOOD PRESSURE: 136 MMHG | DIASTOLIC BLOOD PRESSURE: 76 MMHG | HEART RATE: 103 BPM

## 2019-03-20 PROCEDURE — 75810000275 HC EMERGENCY DEPT VISIT NO LEVEL OF CARE

## 2019-06-24 ENCOUNTER — HOSPITAL ENCOUNTER (EMERGENCY)
Age: 29
Discharge: HOME | End: 2019-06-24
Attending: EMERGENCY MEDICINE | Admitting: EMERGENCY MEDICINE
Payer: MEDICAID

## 2019-06-24 ENCOUNTER — APPOINTMENT (OUTPATIENT)
Dept: CT IMAGING | Age: 29
End: 2019-06-24
Attending: EMERGENCY MEDICINE
Payer: MEDICAID

## 2019-06-24 VITALS
RESPIRATION RATE: 16 BRPM | HEART RATE: 66 BPM | BODY MASS INDEX: 27.53 KG/M2 | DIASTOLIC BLOOD PRESSURE: 79 MMHG | SYSTOLIC BLOOD PRESSURE: 122 MMHG | OXYGEN SATURATION: 95 % | WEIGHT: 192.31 LBS | TEMPERATURE: 97.3 F | HEIGHT: 70 IN

## 2019-06-24 DIAGNOSIS — S09.90XA MINOR HEAD INJURY, INITIAL ENCOUNTER: Primary | ICD-10-CM

## 2019-06-24 PROCEDURE — 99283 EMERGENCY DEPT VISIT LOW MDM: CPT

## 2019-06-24 PROCEDURE — 70450 CT HEAD/BRAIN W/O DYE: CPT

## 2019-06-24 RX ORDER — TRAMADOL HYDROCHLORIDE 50 MG/1
50 TABLET ORAL
Qty: 10 TAB | Refills: 0 | Status: SHIPPED | OUTPATIENT
Start: 2019-06-24 | End: 2019-06-27

## 2019-06-24 NOTE — ED TRIAGE NOTES
Pt reports headache and lightheadedness x1 week. Pt smells of marijuana in triage and has difficulty staying awake.

## 2019-06-24 NOTE — ED NOTES
Discharge instructions were given to the patient by provider and Suzanne Fuller RN. The patient left the Emergency Department ambulatory, alert and oriented and in no acute distress with 1 prescription, patient declined work note. The patient was encouraged to call or return to the ED for worsening issues or problems and was encouraged to schedule a follow up appointment for continuing care. The patient verbalized understanding of discharge instructions and prescriptions, all questions were answered. The patient has no further concerns at this time.

## 2019-06-24 NOTE — DISCHARGE INSTRUCTIONS
Patient Education        Learning About a Closed Head Injury  What is a closed head injury? A closed head injury happens when your head gets hit hard. The strong force of the blow causes your brain to shake in your skull. This movement can cause the brain to bruise, swell, or tear. Sometimes nerves or blood vessels also get damaged. This can cause bleeding in or around the brain. A concussion is a type of closed head injury. What are the symptoms? If you have a mild concussion, you may have a mild headache or feel \"not quite right. \" These symptoms are common. They usually go away over a few days to 4 weeks. But sometimes after a concussion, you feel like you can't function as well as before the injury. And you have new symptoms. This is called postconcussive syndrome. You may:  · Find it harder to solve problems, think, concentrate, or remember. · Have headaches. · Have changes in your sleep patterns, such as not being able to sleep or sleeping all the time. · Have changes in your personality. · Not be interested in your usual activities. · Feel angry or anxious without a clear reason. · Lose your sense of taste or smell. · Be dizzy, lightheaded, or unsteady. It may be hard to stand or walk. How is a closed head injury treated? Any person who may have a concussion needs to see a doctor. Some people have to stay in the hospital to be watched. Others can go home safely. If you go home, follow your doctor's instructions. He or she will tell you if you need someone to watch you closely for the next 24 hours or longer. Rest is the best treatment. Get plenty of sleep at night. And try to rest during the day. · Avoid activities that are physically or mentally demanding. These include housework, exercise, and schoolwork. And don't play video games, send text messages, or use the computer. You may need to change your school or work schedule to be able to avoid these activities.   · Ask your doctor when it's okay to drive, ride a bike, or operate machinery. · Take an over-the-counter pain medicine, such as acetaminophen (Tylenol), ibuprofen (Advil, Motrin), or naproxen (Aleve). Be safe with medicines. Read and follow all instructions on the label. · Check with your doctor before you use any other medicines for pain. · Do not drink alcohol or use illegal drugs. They can slow recovery. They can also increase your risk of getting a second head injury. Follow-up care is a key part of your treatment and safety. Be sure to make and go to all appointments, and call your doctor if you are having problems. It's also a good idea to know your test results and keep a list of the medicines you take. Where can you learn more? Go to http://aminata-kenneth.info/. Enter E235 in the search box to learn more about \"Learning About a Closed Head Injury. \"  Current as of: Jhoana 3, 2018  Content Version: 11.9  © 1300-1021 [employer]. Care instructions adapted under license by [employer] (which disclaims liability or warranty for this information). If you have questions about a medical condition or this instruction, always ask your healthcare professional. [address] any warranty or liability for your use of this information.

## 2019-06-24 NOTE — ED NOTES
Pt presents to ED ambulatory complaining of headache x1 week. Pt reports taking aleve and excedrin without relief. Pt smells very strongly of marijauan and falls asleep easily. Pt also reports lightheadedness. Pt is alert and oriented x 4, RR even and unlabored, skin is warm and dry. Assessment completed and pt updated on plan of care. Emergency Department Nursing Plan of Care       The Nursing Plan of Care is developed from the Nursing assessment and Emergency Department Attending provider initial evaluation. The plan of care may be reviewed in the ED Provider note.     The Plan of Care was developed with the following considerations:   Patient / Family readiness to learn indicated by:verbalized understanding  Persons(s) to be included in education: patient  Barriers to Learning/Limitations:No    Signed     Xochitl Ernandez RN    6/24/2019   2:55 AM

## 2019-06-24 NOTE — LETTER
[facility] EMERGENCY DEPT 
[address] [place] [address] [address] [id number] [phone] Work/School Note Date: 6/24/2019 To Whom It May concern: 
 
Charan Dawson was seen and treated today in the emergency room by the following provider(s): 
Attending Provider: Effie Nino MD. Charan Dawson may return to work on 6/26/2019 or earlier if feeling better. Sincerely, Fermin Hillman RN
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
## 2019-08-13 ENCOUNTER — APPOINTMENT (OUTPATIENT)
Dept: CT IMAGING | Age: 29
End: 2019-08-13
Attending: EMERGENCY MEDICINE
Payer: MEDICAID

## 2019-08-13 ENCOUNTER — HOSPITAL ENCOUNTER (EMERGENCY)
Age: 29
Discharge: HOME | End: 2019-08-13
Attending: EMERGENCY MEDICINE | Admitting: EMERGENCY MEDICINE
Payer: MEDICAID

## 2019-08-13 VITALS
HEART RATE: 60 BPM | BODY MASS INDEX: 28.14 KG/M2 | RESPIRATION RATE: 12 BRPM | TEMPERATURE: 98.4 F | WEIGHT: 190 LBS | OXYGEN SATURATION: 98 % | HEIGHT: 69 IN | DIASTOLIC BLOOD PRESSURE: 67 MMHG | SYSTOLIC BLOOD PRESSURE: 98 MMHG

## 2019-08-13 DIAGNOSIS — K92.2 ACUTE UPPER GI BLEEDING: Primary | ICD-10-CM

## 2019-08-13 LAB
ALBUMIN SERPL-MCNC: 3.8 G/DL (ref 3.5–5)
ALBUMIN/GLOB SERPL: 1.2 {RATIO} (ref 1.1–2.2)
ALP SERPL-CCNC: 68 U/L (ref 45–117)
ALT SERPL-CCNC: 24 U/L (ref 12–78)
ANION GAP SERPL CALC-SCNC: 9 MMOL/L (ref 5–15)
AST SERPL-CCNC: 22 U/L (ref 15–37)
BASOPHILS # BLD: 0 K/UL (ref 0–0.1)
BASOPHILS NFR BLD: 1 % (ref 0–1)
BILIRUB SERPL-MCNC: 0.5 MG/DL (ref 0.2–1)
BUN SERPL-MCNC: 15 MG/DL (ref 6–20)
BUN/CREAT SERPL: 16 (ref 12–20)
CALCIUM SERPL-MCNC: 9.1 MG/DL (ref 8.5–10.1)
CHLORIDE SERPL-SCNC: 101 MMOL/L (ref 97–108)
CO2 SERPL-SCNC: 29 MMOL/L (ref 21–32)
CREAT SERPL-MCNC: 0.96 MG/DL (ref 0.7–1.3)
DIFFERENTIAL METHOD BLD: NORMAL
EOSINOPHIL # BLD: 0.3 K/UL (ref 0–0.4)
EOSINOPHIL NFR BLD: 5 % (ref 0–7)
ERYTHROCYTE [DISTWIDTH] IN BLOOD BY AUTOMATED COUNT: 12 % (ref 11.5–14.5)
GLOBULIN SER CALC-MCNC: 3.1 G/DL (ref 2–4)
GLUCOSE SERPL-MCNC: 109 MG/DL (ref 65–100)
HCT VFR BLD AUTO: 37.9 % (ref 36.6–50.3)
HGB BLD-MCNC: 12.3 G/DL (ref 12.1–17)
IMM GRANULOCYTES # BLD AUTO: 0 K/UL (ref 0–0.04)
IMM GRANULOCYTES NFR BLD AUTO: 0 % (ref 0–0.5)
LIPASE SERPL-CCNC: 47 U/L (ref 73–393)
LYMPHOCYTES # BLD: 2.4 K/UL (ref 0.8–3.5)
LYMPHOCYTES NFR BLD: 43 % (ref 12–49)
MCH RBC QN AUTO: 28.4 PG (ref 26–34)
MCHC RBC AUTO-ENTMCNC: 32.5 G/DL (ref 30–36.5)
MCV RBC AUTO: 87.5 FL (ref 80–99)
MONOCYTES # BLD: 0.6 K/UL (ref 0–1)
MONOCYTES NFR BLD: 12 % (ref 5–13)
NEUTS SEG # BLD: 2.1 K/UL (ref 1.8–8)
NEUTS SEG NFR BLD: 39 % (ref 32–75)
NRBC # BLD: 0 K/UL (ref 0–0.01)
NRBC BLD-RTO: 0 PER 100 WBC
PLATELET # BLD AUTO: 198 K/UL (ref 150–400)
PMV BLD AUTO: 11 FL (ref 8.9–12.9)
POTASSIUM SERPL-SCNC: 3.6 MMOL/L (ref 3.5–5.1)
PROT SERPL-MCNC: 6.9 G/DL (ref 6.4–8.2)
RBC # BLD AUTO: 4.33 M/UL (ref 4.1–5.7)
SODIUM SERPL-SCNC: 139 MMOL/L (ref 136–145)
WBC # BLD AUTO: 5.4 K/UL (ref 4.1–11.1)

## 2019-08-13 PROCEDURE — 74177 CT ABD & PELVIS W/CONTRAST: CPT

## 2019-08-13 PROCEDURE — 74011636320 HC RX REV CODE- 636/320: Performed by: EMERGENCY MEDICINE

## 2019-08-13 PROCEDURE — 80053 COMPREHEN METABOLIC PANEL: CPT

## 2019-08-13 PROCEDURE — 96374 THER/PROPH/DIAG INJ IV PUSH: CPT

## 2019-08-13 PROCEDURE — 85025 COMPLETE CBC W/AUTO DIFF WBC: CPT

## 2019-08-13 PROCEDURE — 74011250636 HC RX REV CODE- 250/636: Performed by: EMERGENCY MEDICINE

## 2019-08-13 PROCEDURE — 99283 EMERGENCY DEPT VISIT LOW MDM: CPT

## 2019-08-13 PROCEDURE — 83690 ASSAY OF LIPASE: CPT

## 2019-08-13 PROCEDURE — 36415 COLL VENOUS BLD VENIPUNCTURE: CPT

## 2019-08-13 PROCEDURE — 96361 HYDRATE IV INFUSION ADD-ON: CPT

## 2019-08-13 RX ORDER — ONDANSETRON 4 MG/1
4 TABLET, ORALLY DISINTEGRATING ORAL
Qty: 10 TAB | Refills: 0 | Status: SHIPPED | OUTPATIENT
Start: 2019-08-13 | End: 2022-02-25

## 2019-08-13 RX ORDER — FAMOTIDINE 20 MG/1
20 TABLET, FILM COATED ORAL 2 TIMES DAILY
Qty: 20 TAB | Refills: 0 | Status: SHIPPED | OUTPATIENT
Start: 2019-08-13 | End: 2022-02-25

## 2019-08-13 RX ORDER — ONDANSETRON 2 MG/ML
4 INJECTION INTRAMUSCULAR; INTRAVENOUS
Status: COMPLETED | OUTPATIENT
Start: 2019-08-13 | End: 2019-08-13

## 2019-08-13 RX ADMIN — IOPAMIDOL 100 ML: 755 INJECTION, SOLUTION INTRAVENOUS at 06:06

## 2019-08-13 RX ADMIN — ONDANSETRON HYDROCHLORIDE 4 MG: 2 SOLUTION INTRAMUSCULAR; INTRAVENOUS at 04:56

## 2019-08-13 RX ADMIN — SODIUM CHLORIDE 1000 ML: 900 INJECTION, SOLUTION INTRAVENOUS at 04:56

## 2019-08-13 NOTE — ED PROVIDER NOTES
EMERGENCY DEPARTMENT HISTORY AND PHYSICAL EXAM      Date: 8/13/2019  Patient Name: Kianna Jack    History of Presenting Illness     Chief Complaint   Patient presents with    Vomiting    Melena       History Provided By: Patient    HPI: Kianna Jack, 29 y.o. male with PMHx significant for nothing, presents ambulatory to the ED with cc of hematemesis and melena. This is a 45-year-old male who is been vomiting blood and passing blood with his stool the past day. He currently feels nauseated and did have hematemesis in route to the ER while ambulating. No medical problems. He takes no medicines and has no allergies. He had no surgery on his abdomen. There are no other complaints, changes, or physical findings at this time. PCP: None    Current Outpatient Medications   Medication Sig Dispense Refill    famotidine (PEPCID) 20 mg tablet Take 1 Tab by mouth two (2) times a day. 20 Tab 0    ondansetron (ZOFRAN ODT) 4 mg disintegrating tablet Take 1 Tab by mouth every eight (8) hours as needed for Nausea. 10 Tab 0       Past History     Past Medical History:  History reviewed. No pertinent past medical history. Past Surgical History:  History reviewed. No pertinent surgical history. Family History:  History reviewed. No pertinent family history. Social History:  Social History     Tobacco Use    Smoking status: Never Smoker    Smokeless tobacco: Never Used   Substance Use Topics    Alcohol use: Yes     Comment: rarely    Drug use: Yes     Types: Marijuana     Comment: 2-4 \"blunts\" a day        Allergies:  No Known Allergies      Review of Systems   Review of Systems   Constitutional: Negative for chills and fever. HENT: Negative for congestion, rhinorrhea, sneezing and sore throat. Eyes: Negative for redness and visual disturbance. Respiratory: Negative for shortness of breath. Cardiovascular: Negative for chest pain and leg swelling.    Gastrointestinal: Positive for abdominal pain, blood in stool, nausea and vomiting. Genitourinary: Negative for difficulty urinating and frequency. Musculoskeletal: Negative for back pain, myalgias and neck stiffness. Skin: Negative for rash. Neurological: Negative for dizziness, syncope, weakness and headaches. Hematological: Negative for adenopathy. All other systems reviewed and are negative. Physical Exam   Physical Exam   Constitutional: He is oriented to person, place, and time. He appears well-developed and well-nourished. HENT:   Head: Normocephalic and atraumatic. Mouth/Throat: Oropharynx is clear and moist and mucous membranes are normal.   Eyes: EOM are normal.   Neck: Normal range of motion and full passive range of motion without pain. Neck supple. Cardiovascular: Normal rate, regular rhythm, normal heart sounds, intact distal pulses and normal pulses. No murmur heard. Pulmonary/Chest: Effort normal and breath sounds normal. No respiratory distress. He exhibits no tenderness. Abdominal: Soft. Normal appearance and bowel sounds are normal. There is no tenderness. There is no rebound and no guarding. Neurological: He is alert and oriented to person, place, and time. He has normal strength. Skin: Skin is warm, dry and intact. No rash noted. No erythema. Psychiatric: He has a normal mood and affect. His speech is normal and behavior is normal. Judgment and thought content normal.   Nursing note and vitals reviewed.       Diagnostic Study Results     Labs -     Recent Results (from the past 12 hour(s))   CBC WITH AUTOMATED DIFF    Collection Time: 08/13/19  4:55 AM   Result Value Ref Range    WBC 5.4 4.1 - 11.1 K/uL    RBC 4.33 4.10 - 5.70 M/uL    HGB 12.3 12.1 - 17.0 g/dL    HCT 37.9 36.6 - 50.3 %    MCV 87.5 80.0 - 99.0 FL    MCH 28.4 26.0 - 34.0 PG    MCHC 32.5 30.0 - 36.5 g/dL    RDW 12.0 11.5 - 14.5 %    PLATELET 646 485 - 758 K/uL    MPV 11.0 8.9 - 12.9 FL    NRBC 0.0 0  WBC    ABSOLUTE NRBC 0. 00 0.00 - 0.01 K/uL    NEUTROPHILS 39 32 - 75 %    LYMPHOCYTES 43 12 - 49 %    MONOCYTES 12 5 - 13 %    EOSINOPHILS 5 0 - 7 %    BASOPHILS 1 0 - 1 %    IMMATURE GRANULOCYTES 0 0.0 - 0.5 %    ABS. NEUTROPHILS 2.1 1.8 - 8.0 K/UL    ABS. LYMPHOCYTES 2.4 0.8 - 3.5 K/UL    ABS. MONOCYTES 0.6 0.0 - 1.0 K/UL    ABS. EOSINOPHILS 0.3 0.0 - 0.4 K/UL    ABS. BASOPHILS 0.0 0.0 - 0.1 K/UL    ABS. IMM. GRANS. 0.0 0.00 - 0.04 K/UL    DF AUTOMATED     METABOLIC PANEL, COMPREHENSIVE    Collection Time: 08/13/19  4:55 AM   Result Value Ref Range    Sodium 139 136 - 145 mmol/L    Potassium 3.6 3.5 - 5.1 mmol/L    Chloride 101 97 - 108 mmol/L    CO2 29 21 - 32 mmol/L    Anion gap 9 5 - 15 mmol/L    Glucose 109 (H) 65 - 100 mg/dL    BUN 15 6 - 20 MG/DL    Creatinine 0.96 0.70 - 1.30 MG/DL    BUN/Creatinine ratio 16 12 - 20      GFR est AA >60 >60 ml/min/1.73m2    GFR est non-AA >60 >60 ml/min/1.73m2    Calcium 9.1 8.5 - 10.1 MG/DL    Bilirubin, total 0.5 0.2 - 1.0 MG/DL    ALT (SGPT) 24 12 - 78 U/L    AST (SGOT) 22 15 - 37 U/L    Alk. phosphatase 68 45 - 117 U/L    Protein, total 6.9 6.4 - 8.2 g/dL    Albumin 3.8 3.5 - 5.0 g/dL    Globulin 3.1 2.0 - 4.0 g/dL    A-G Ratio 1.2 1.1 - 2.2     LIPASE    Collection Time: 08/13/19  4:55 AM   Result Value Ref Range    Lipase 47 (L) 73 - 393 U/L       Radiologic Studies -   CT ABD PELV W CONT   Final Result   IMPRESSION:   Unremarkable CT through the abdomen and pelvis. CT Results  (Last 48 hours)               08/13/19 0605  CT ABD PELV W CONT Final result    Impression:  IMPRESSION:   Unremarkable CT through the abdomen and pelvis. Narrative:  EXAM: CT ABD PELV W CONT       INDICATION: Abdominal pain; Diverticulitis vomiting and melena, 2 episodes of   vomiting blood in the last one to 2 hours and blood in his stool for one day. COMPARISON: 2013        CONTRAST: 90 mL of Isovue-370.        TECHNIQUE:    Following the uneventful intravenous administration of contrast, thin axial   images were obtained through the abdomen and pelvis. Coronal and sagittal   reconstructions were generated. Oral contrast was not administered. CT dose   reduction was achieved through use of a standardized protocol tailored for this   examination and automatic exposure control for dose modulation. FINDINGS:    LUNG BASES: Clear. INCIDENTALLY IMAGED HEART AND MEDIASTINUM: Unremarkable. LIVER: No mass or biliary dilatation. GALLBLADDER: Unremarkable. SPLEEN: No mass. PANCREAS: No mass or ductal dilatation. ADRENALS: Unremarkable. KIDNEYS: No mass, calculus, or hydronephrosis. STOMACH: Unremarkable. SMALL BOWEL: No dilatation or wall thickening. COLON: No dilatation or wall thickening. APPENDIX: Unremarkable. PERITONEUM: No ascites or pneumoperitoneum. RETROPERITONEUM: No lymphadenopathy or aortic aneurysm. REPRODUCTIVE ORGANS: Normal.   URINARY BLADDER: No mass or calculus. BONES: No destructive bone lesion. ADDITIONAL COMMENTS: N/A               CXR Results  (Last 48 hours)    None            Medical Decision Making   I am the first provider for this patient. I reviewed the vital signs, available nursing notes, past medical history, past surgical history, family history and social history. Vital Signs-Reviewed the patient's vital signs. Patient Vitals for the past 12 hrs:   Temp Pulse Resp BP SpO2   08/13/19 0630  64 12 114/62 98 %   08/13/19 0615 98.4 °F (36.9 °C) 60 12 108/57 98 %   08/13/19 0439 98 °F (36.7 °C) 93 18 126/80 99 %         Records Reviewed: Nursing Notes    Provider Notes (Medical Decision Making): Intra-abdominal process versus diverticulitis versus esophageal varices    ED Course:   Initial assessment performed. The patients presenting problems have been discussed, and they are in agreement with the care plan formulated and outlined with them. I have encouraged them to ask questions as they arise throughout their visit.        Patient did well in the emergency department with no active bleeding. Pain improved and his labs were normal.  CT was also normal and he was suitable for discharge    Disposition:  Patient informed of results of workup and is comfortable with discharge to home to follow up with PCP. They are instructed to return as needed for worsening condition. PLAN:  1. Current Discharge Medication List      START taking these medications    Details   famotidine (PEPCID) 20 mg tablet Take 1 Tab by mouth two (2) times a day. Qty: 20 Tab, Refills: 0      ondansetron (ZOFRAN ODT) 4 mg disintegrating tablet Take 1 Tab by mouth every eight (8) hours as needed for Nausea. Qty: 10 Tab, Refills: 0           2. Follow-up Information     Follow up With Specialties Details Why [address] [facility] EMERGENCY DEPT Emergency Medicine  As needed, If symptoms worsen Zain Garnica    [facility]  Schedule an appointment as soon as possible for a visit  [address] [place] [address] Drive  [phone]        Return to ED if worse     Diagnosis     Clinical Impression:   1.  Acute upper GI bleeding

## 2019-08-13 NOTE — ED NOTES
Pt presents to the ED with c/o 2 episodes of vomiting blood within the last 1-2 hours and blood in his stool x 1 day. Reports upper left sided abdominal pain. Denies SOB, chest pain, fevers, chills or body aches. Denies drug or alcohol use. Pt is alert, oriented and appropriate. Pt ambulatory on arrival. Bright red Blood splatter marks noted on shoes. Upper left quadrant pain tender to palpation. pts skin is warm and dry to touch. Skin color appropriate for ethnicity. Central pulses present and normal.       Emergency Department Nursing Plan of Care       The Nursing Plan of Care is developed from the Nursing assessment and Emergency Department Attending provider initial evaluation. The plan of care may be reviewed in the ED Provider note.     The Plan of Care was developed with the following considerations:   Patient / Family readiness to learn indicated by:verbalized understanding  Persons(s) to be included in education: patient  Barriers to Learning/Limitations:No    Signed     Tania Curling    8/13/2019   4:47 AM

## 2019-08-13 NOTE — DISCHARGE INSTRUCTIONS
Patient Education        Gastrointestinal Bleeding: Care Instructions  Your Care Instructions    The digestive or gastrointestinal tract goes from the mouth to the anus. It is often called the GI tract. Bleeding can happen anywhere in the GI tract. It may be caused by an ulcer, an infection, or cancer. It may also be caused by medicines such as aspirin or ibuprofen. Light bleeding may not cause any symptoms at first. But if you continue to bleed for a while, you may feel very weak or tired. Sudden, heavy bleeding means you need to see a doctor right away. This kind of bleeding can be very dangerous. But it can usually be cured or controlled. The doctor may do some tests to find the cause of your bleeding. Follow-up care is a key part of your treatment and safety. Be sure to make and go to all appointments, and call your doctor if you are having problems. It's also a good idea to know your test results and keep a list of the medicines you take. How can you care for yourself at home? · Be safe with medicines. Take your medicines exactly as prescribed. Call your doctor if you think you are having a problem with your medicine. You will get more details on the specific medicines your doctor prescribes. · Do not take aspirin or other anti-inflammatory medicines, such as naproxen (Aleve) or ibuprofen (Advil, Motrin), without talking to your doctor first. Ask your doctor if it is okay to use acetaminophen (Tylenol). · Do not drink alcohol. · The bleeding may make you lose iron. So it's important to eat foods that have a lot of iron. These include red meat, shellfish, poultry, and eggs. They also include beans, raisins, whole-grain breads, and leafy green vegetables. If you want help planning meals, you can make an appointment with a dietitian. When should you call for help? Call 911 anytime you think you may need emergency care.  For example, call if:    · You have sudden, severe belly pain.     · You vomit blood or what looks like coffee grounds.     · You passed out (lost consciousness).     · Your stools are maroon or very bloody.    Call your doctor now or seek immediate medical care if:    · You are dizzy or lightheaded, or you feel like you may faint.     · Your stools are black and look like tar, or they have streaks of blood.     · You have belly pain.     · You vomit or have nausea.     · You have trouble swallowing, or it hurts when you swallow.    Watch closely for changes in your health, and be sure to contact your doctor if:    · You do not get better as expected. Where can you learn more? Go to http://aminata-kenneth.info/. Enter L793 in the search box to learn more about \"Gastrointestinal Bleeding: Care Instructions. \"  Current as of: September 23, 2018  Content Version: 12.1  © 5302-6048 [employer]. Care instructions adapted under license by [employer] (which disclaims liability or warranty for this information). If you have questions about a medical condition or this instruction, always ask your healthcare professional. [place] [address] any warranty or liability for your use of this information. Patient Education        Upper Gastrointestinal Bleeding: Care Instructions  Your Care Instructions    The digestive or gastrointestinal tract goes from the mouth to the anus. It is often called the GI tract. Bleeding in the upper GI tract can happen anywhere from the esophagus to the first part of the small intestine. Sometimes it's caused by an ulcer in your stomach. Or it may be caused by blood vessels in your esophagus. Your esophagus is the tube that carries food from your throat to your stomach. Light bleeding may not cause any symptoms at first. But if you continue to bleed for a while, you may feel very weak or tired. Sudden, heavy bleeding means you need to see a doctor right away. This kind of bleeding can be very dangerous.  But it can usually be cured or controlled. The doctor may do some tests to find the cause of your bleeding. Follow-up care is a key part of your treatment and safety. Be sure to make and go to all appointments, and call your doctor if you are having problems. It's also a good idea to know your test results and keep a list of the medicines you take. How can you care for yourself at home? · Be safe with medicines. Take your medicines exactly as prescribed. Call your doctor if you think you are having a problem with your medicine. You will get more details on the specific medicines your doctor prescribes. · Do not take aspirin or other anti-inflammatory medicines, such as naproxen (Aleve) or ibuprofen (Advil, Motrin), without talking to your doctor first. Ask your doctor if it is okay to use acetaminophen (Tylenol). · Do not drink alcohol. · The bleeding may make you lose iron. So it's important to eat foods that have a lot of iron. These include red meat, shellfish, poultry, and eggs. They also include beans, raisins, whole-grain breads, and leafy green vegetables. If you want help planning meals, you can meet with a dietitian. When should you call for help? Call 911 anytime you think you may need emergency care. For example, call if:    · You have sudden, severe belly pain.     · You vomit blood or what looks like coffee grounds.     · You passed out (lost consciousness).     · Your stools are maroon or very bloody.    Call your doctor now or seek immediate medical care if:    · You are dizzy or lightheaded, or you feel like you may faint.     · Your stools are black and look like tar.     · You have belly pain.     · You vomit or have nausea.     · You have trouble swallowing, or it hurts when you swallow.    Watch closely for changes in your health, and be sure to contact your doctor if you do not get better as expected. Where can you learn more? Go to http://aminata-kenneth.info/.   Enter Y789 in the search box to learn more about \"Upper Gastrointestinal Bleeding: Care Instructions. \"  Current as of: September 23, 2018  Content Version: 12.1  © 9403-2890 [employer]. Care instructions adapted under license by [employer] (which disclaims liability or warranty for this information). If you have questions about a medical condition or this instruction, always ask your healthcare professional. [place] [address] any warranty or liability for your use of this information.

## 2019-08-13 NOTE — ED NOTES
Pt resting quietly in room. IV fluids finished. Pt reports his nausea decreasing but is still complaining of abdominal pain. Waiting on CT scan to result. No s/si of distress noted at this time. Call bell within reach.

## 2020-02-03 ENCOUNTER — HOSPITAL ENCOUNTER (EMERGENCY)
Age: 30
Discharge: HOME | End: 2020-02-03
Attending: EMERGENCY MEDICINE
Payer: SELF-PAY

## 2020-02-03 VITALS
WEIGHT: 190 LBS | DIASTOLIC BLOOD PRESSURE: 86 MMHG | SYSTOLIC BLOOD PRESSURE: 140 MMHG | BODY MASS INDEX: 27.2 KG/M2 | OXYGEN SATURATION: 99 % | TEMPERATURE: 98 F | HEIGHT: 70 IN | RESPIRATION RATE: 16 BRPM | HEART RATE: 60 BPM

## 2020-02-03 DIAGNOSIS — K08.89 DENTALGIA: Primary | ICD-10-CM

## 2020-02-03 PROCEDURE — 74011250637 HC RX REV CODE- 250/637: Performed by: PHYSICIAN ASSISTANT

## 2020-02-03 PROCEDURE — 99283 EMERGENCY DEPT VISIT LOW MDM: CPT

## 2020-02-03 PROCEDURE — 74011000250 HC RX REV CODE- 250: Performed by: PHYSICIAN ASSISTANT

## 2020-02-03 RX ORDER — PENICILLIN V POTASSIUM 500 MG/1
500 TABLET, FILM COATED ORAL 4 TIMES DAILY
Qty: 28 TAB | Refills: 0 | Status: SHIPPED | OUTPATIENT
Start: 2020-02-03 | End: 2020-02-10

## 2020-02-03 RX ORDER — IBUPROFEN 800 MG/1
800 TABLET ORAL
Qty: 20 TAB | Refills: 0 | Status: SHIPPED | OUTPATIENT
Start: 2020-02-03 | End: 2020-02-10

## 2020-02-03 RX ADMIN — DIPHENHYDRAMINE HYDROCHLORIDE: 12.5 LIQUID ORAL at 22:02

## 2020-02-04 NOTE — ED PROVIDER NOTES
EMERGENCY DEPARTMENT HISTORY AND PHYSICAL EXAM      Date: 2/3/2020  Patient Name: Lety Pringle    History of Presenting Illness     Chief Complaint   Patient presents with    Dental Pain     History Provided By: Patient    HPI: Lety Pringle, 34 y.o. male with tobacco abuse who presents ambulatory to the ED with cc of acute moderate aching Rt upper dentalgia x 2 days. Pt endorses hx of chipped tooth. Denies any recent injury or trauma. Tylenol w/o relief. Denies fever, chills, n/v, headache, trismus, drainage. PCP: None    There are no other complaints, changes, or physical findings at this time. No current facility-administered medications on file prior to encounter. Current Outpatient Medications on File Prior to Encounter   Medication Sig Dispense Refill    famotidine (PEPCID) 20 mg tablet Take 1 Tab by mouth two (2) times a day. 20 Tab 0    ondansetron (ZOFRAN ODT) 4 mg disintegrating tablet Take 1 Tab by mouth every eight (8) hours as needed for Nausea. 10 Tab 0     Past History     Past Medical History:  No past medical history on file. Past Surgical History:  No past surgical history on file. Family History:  No family history on file. Social History:  Social History     Tobacco Use    Smoking status: Never Smoker    Smokeless tobacco: Never Used   Substance Use Topics    Alcohol use: Yes     Comment: rarely    Drug use: Yes     Types: Marijuana     Comment: 2-4 \"blunts\" a day      Allergies:  No Known Allergies  Review of Systems   Review of Systems   Constitutional: Negative for activity change, appetite change, chills, fatigue and fever. HENT: Positive for dental problem. Negative for congestion, drooling, ear pain, facial swelling, mouth sores, postnasal drip, rhinorrhea and sore throat. Eyes: Negative for pain and discharge. Respiratory: Negative. Negative for apnea, cough and shortness of breath. Cardiovascular: Negative. Negative for chest pain. Gastrointestinal: Negative. Negative for abdominal pain, constipation, diarrhea, nausea and vomiting. Musculoskeletal: Negative. Skin: Negative. Negative for color change and rash. Neurological: Negative. Negative for light-headedness and headaches. Psychiatric/Behavioral: Negative. Physical Exam   Physical Exam  Vitals signs and nursing note reviewed. Constitutional:       General: He is not in acute distress. Appearance: He is well-developed. He is not diaphoretic. HENT:      Head: Normocephalic and atraumatic. Jaw: There is normal jaw occlusion. No trismus. Right Ear: Hearing, tympanic membrane, ear canal and external ear normal.      Left Ear: Hearing, tympanic membrane, ear canal and external ear normal.      Nose: Nose normal. No mucosal edema or rhinorrhea. Right Sinus: No maxillary sinus tenderness or frontal sinus tenderness. Left Sinus: No maxillary sinus tenderness or frontal sinus tenderness. Mouth/Throat:      Mouth: Mucous membranes are moist. No oral lesions or angioedema. Dentition: Abnormal dentition. Does not have dentures. Dental tenderness (#2) and dental caries present. No gingival swelling, dental abscesses or gum lesions. Pharynx: Oropharynx is clear. Uvula midline. No oropharyngeal exudate or uvula swelling. Eyes:      Conjunctiva/sclera: Conjunctivae normal.      Pupils: Pupils are equal, round, and reactive to light. Neck:      Musculoskeletal: Normal range of motion and neck supple. Pulmonary:      Effort: Pulmonary effort is normal. No accessory muscle usage or respiratory distress. Skin:     General: Skin is warm and dry. Coloration: Skin is not pale. Neurological:      Mental Status: He is alert and oriented to person, place, and time. Psychiatric:         Speech: Speech normal.         Behavior: Behavior normal.         Thought Content:  Thought content normal.         Judgment: Judgment normal. Diagnostic Study Results   Labs -   No results found for this or any previous visit (from the past 12 hour(s)). Radiologic Studies -   No orders to display     No results found. Medical Decision Making   I am the first provider for this patient. I reviewed the vital signs, available nursing notes, past medical history, past surgical history, family history and social history. Vital Signs-Reviewed the patient's vital signs. Patient Vitals for the past 12 hrs:   Temp Pulse Resp BP SpO2   02/03/20 2206 98 °F (36.7 °C) 60 16 140/86 99 %   02/03/20 2132 98 °F (36.7 °C) 63 20 141/90 97 %     Pulse Oximetry Analysis - 99% on RA    Records Reviewed: Nursing Notes, Old Medical Records, Previous Radiology Studies and Previous Laboratory Studies    Provider Notes (Medical Decision Making):   Patient presents with dental pain. No obvious abscess that needs drainage. No red flags that make PTA, RPA, ludwigs angina concerning. Will tx with dental ball, antibiotics and outpatient analgesics. Given information on dentists and importance of followup and no smoking. TOBACCO COUNSELING:  Upon evaluation, pt expressed that they are a current tobacco user. Pt has been counseled on the dangers of smoking and was encouraged to quit as soon as possible in order to decrease further risks to their health. Pt has conveyed their understanding of the risks involved should they continue to use tobacco products. ED Course:   Initial assessment performed. The patients presenting problems have been discussed, and they are in agreement with the care plan formulated and outlined with them. I have encouraged them to ask questions as they arise throughout their visit. Progress Note:   Updated pt on all returned results and findings. Discussed the importance of proper follow up as referred below along with return precautions.  Pt in agreement with the care plan and expresses agreement with and understanding of all items discussed. Disposition:  10:05 PM  I have discussed with patient their diagnosis, treatment, and follow up plan. The patient agrees to follow up as outlined in discharge paperwork and also to return to the ED with any worsening. Malcolm Simmonds, PA-C      PLAN:  1. Discharge Medication List as of 2/3/2020 10:02 PM      START taking these medications    Details   penicillin v potassium (VEETID) 500 mg tablet Take 1 Tab by mouth four (4) times daily for 7 days. , Normal, Disp-28 Tab, R-0      ibuprofen (MOTRIN) 800 mg tablet Take 1 Tab by mouth every six (6) hours as needed for Pain for up to 7 days. , Normal, Disp-20 Tab, R-0         CONTINUE these medications which have NOT CHANGED    Details   famotidine (PEPCID) 20 mg tablet Take 1 Tab by mouth two (2) times a day., Normal, Disp-20 Tab, R-0      ondansetron (ZOFRAN ODT) 4 mg disintegrating tablet Take 1 Tab by mouth every eight (8) hours as needed for Nausea., Normal, Disp-10 Tab, R-0           2. Follow-up Information     Follow up With Specialties Details Why Contact Info    [facility]  Schedule an appointment as soon as possible for a visit in 1 day As needed [address] [place]  [phone]        Return to ED if worse     Diagnosis     Clinical Impression:   1. Dentalgia            Please note that this dictation was completed with Dragon, computer voice recognition software. Quite often unanticipated grammatical, syntax, homophones, and other interpretive errors are inadvertently transcribed by the computer software. Please disregard these errors. Additionally, please excuse any errors that have escaped final proofreading.

## 2020-02-04 NOTE — ED NOTES
Pt presents to the ED with c/o a right upper tooth pain x 3 days. Pt stated he chipped his tooth and now it hurts. Denies having a dentist to see. Pt reports taking tylenol at 12 PM today without relief. Pt is alert, oriented and appropriate. Ambulatory on arrival.       Emergency Department Nursing Plan of Care       The Nursing Plan of Care is developed from the Nursing assessment and Emergency Department Attending provider initial evaluation. The plan of care may be reviewed in the ED Provider note.     The Plan of Care was developed with the following considerations:   Patient / Family readiness to learn indicated by:verbalized understanding  Persons(s) to be included in education: patient  Barriers to Learning/Limitations:No    Signed     Marjean Drivers    2/3/2020   9:54 PM

## 2020-02-04 NOTE — DISCHARGE INSTRUCTIONS
Patient Education        Tooth and Gum Pain: Care Instructions  Your Care Instructions    The most common causes of dental pain are tooth decay and gum disease. Pain can also be caused by an infection of the tooth (abscess) or the gums. Or you may have pain from a broken or cracked tooth. Other causes of pain include infection and damage to a tooth from nervous grinding of your teeth. A wisdom tooth can be painful when it is coming in but cannot break through the gum. It can also be painful when the tooth is only partway in and extra gum tissue has formed around it. The tissue can get inflamed (pericoronitis), and sometimes it gets infected. Prompt dental care can help find the cause of your toothache and keep the tooth from dying or gum disease from getting worse. Self-care at home may reduce your pain and discomfort. Follow-up care is a key part of your treatment and safety. Be sure to make and go to all appointments, and call your dentist or doctor if you are having problems. It's also a good idea to know your test results and keep a list of the medicines you take. How can you care for yourself at home? · To reduce pain and facial swelling, put an ice or cold pack on the outside of your cheek for 10 to 20 minutes at a time. Put a thin cloth between the ice and your skin. Do not use heat. · If your doctor prescribed antibiotics, take them as directed. Do not stop taking them just because you feel better. You need to take the full course of antibiotics. · Ask your doctor if you can take an over-the-counter pain medicine, such as acetaminophen (Tylenol), ibuprofen (Advil, Motrin), or naproxen (Aleve). Be safe with medicines. Read and follow all instructions on the label. · Avoid very hot, cold, or sweet foods and drinks if they increase your pain. · Rinse your mouth with warm salt water every 2 hours to help relieve pain and swelling. Mix 1 teaspoon of salt in 8 ounces of water.   · Talk to your dentist about using special toothpaste for sensitive teeth. To reduce pain on contact with heat or cold or when brushing, brush with this toothpaste regularly or rub a small amount of the paste on the sensitive area with a clean finger 2 or 3 times a day. Floss gently between your teeth. · Do not smoke or use spit tobacco. Tobacco use can make gum problems worse, decreases your ability to fight infection in your gums, and delays healing. If you need help quitting, talk to your doctor about stop-smoking programs and medicines. These can increase your chances of quitting for good. When should you call for help? Call 911 anytime you think you may need emergency care. For example, call if:    · You have trouble breathing.    Call your dentist or doctor now or seek immediate medical care if:    · You have signs of infection, such as:  ? Increased pain, swelling, warmth, or redness. ? Red streaks leading from the area. ? Pus draining from the area. ? A fever.    Watch closely for changes in your health, and be sure to contact your doctor if:    · You do not get better as expected. Where can you learn more? Go to http://aminata-kenneth.info/. Enter 0363 [id number] in the search box to learn more about \"Tooth and Gum Pain: Care Instructions. \"  Current as of: October 3, 2018  Content Version: 12.2  © 7737-3026 Healthwise, Inc[employer]. Care instructions adapted under license by [employer] (which disclaims liability or warranty for this information). If you have questions about a medical condition or this instruction, always ask your healthcare professional. [place] [address] any warranty or liability for your use of this information.        Emergency [facility] by [facility] [address]  Open Gisell Heath, F: 8AM - 5PM and T, Th: 8AM-6PM  Phone: [phone], press 4  $70 for Emergency Care  $60 for first routine care, then pay by sliding scale based upon income. [facility]  [address]  Phone: [phone]    [facility] [address] [place] [address] Dentistry  [address] [place] [address], [address]  Phone: [phone]  Fee: $75 Routine Extraction, $125 Complex Extraction  Open Monday - Friday 8AM - 5:00PM    [facility]  [address], [address]  Open Monday - Friday 8AM - 4:30 PM  Phone: [phone] of Dentistry [place] [address] [facility], [address] [place] [address], 1st Floor  [facility] starting at 8:30 AM M-F  Phone: [phone], press 2  Fee: $150 per tooth (x-ray & extractions only)  Pediatrics Phone[de-identified] [phone], 8-5 M-F    [address] Dentistry, [address] [place] [address], 2nd Floor, [address] [place] [address] starting at 8:30 AM - 3 PM [address] [place] [address], [address] [place] [address]  Phone: [phone] or [phone]  Emergency Hours: 9:30AM - 11AM (extractions)  Simple tooth extraction $ per tooth. #75 for x-ray    [facility] Residents only, over the age of 25  Phone: [phone]. Leave message saying you need an appointment to register.   Hours: Tuesday Evenings

## 2020-10-24 ENCOUNTER — HOSPITAL ENCOUNTER (EMERGENCY)
Age: 30
Discharge: HOME | End: 2020-10-24
Attending: EMERGENCY MEDICINE

## 2020-10-24 VITALS
WEIGHT: 190 LBS | SYSTOLIC BLOOD PRESSURE: 139 MMHG | BODY MASS INDEX: 28.14 KG/M2 | DIASTOLIC BLOOD PRESSURE: 80 MMHG | TEMPERATURE: 97.9 F | HEIGHT: 69 IN | RESPIRATION RATE: 15 BRPM | HEART RATE: 78 BPM | OXYGEN SATURATION: 99 %

## 2020-10-24 DIAGNOSIS — Z20.2 STD EXPOSURE: Primary | ICD-10-CM

## 2020-10-24 LAB

## 2020-10-24 PROCEDURE — 87491 CHLMYD TRACH DNA AMP PROBE: CPT

## 2020-10-24 PROCEDURE — 99284 EMERGENCY DEPT VISIT MOD MDM: CPT

## 2020-10-24 PROCEDURE — 81001 URINALYSIS AUTO W/SCOPE: CPT

## 2020-10-24 PROCEDURE — 74011250636 HC RX REV CODE- 250/636: Performed by: EMERGENCY MEDICINE

## 2020-10-24 PROCEDURE — 74011250637 HC RX REV CODE- 250/637: Performed by: EMERGENCY MEDICINE

## 2020-10-24 PROCEDURE — 96372 THER/PROPH/DIAG INJ SC/IM: CPT

## 2020-10-24 PROCEDURE — 74011000250 HC RX REV CODE- 250: Performed by: EMERGENCY MEDICINE

## 2020-10-24 RX ORDER — AZITHROMYCIN 500 MG/1
1000 TABLET, FILM COATED ORAL
Status: COMPLETED | OUTPATIENT
Start: 2020-10-24 | End: 2020-10-24

## 2020-10-24 RX ADMIN — AZITHROMYCIN MONOHYDRATE 1000 MG: 500 TABLET ORAL at 17:45

## 2020-10-24 RX ADMIN — LIDOCAINE HYDROCHLORIDE 250 MG: 10 INJECTION, SOLUTION EPIDURAL; INFILTRATION; INTRACAUDAL; PERINEURAL at 17:45

## 2020-10-24 NOTE — DISCHARGE INSTRUCTIONS
Patient Education        Exposure to Sexually Transmitted Infections: Care Instructions  Your Care Instructions  Sexually transmitted infections (STIs) are those diseases spread by sexual contact. There are at least 20 different STIs, including chlamydia, gonorrhea, syphilis, and human immunodeficiency virus (HIV), which causes AIDS. Bacteria-caused STIs can be treated and cured. STIs caused by viruses, such as HIV, can be treated but not cured. Some STIs can reduce a woman's chances of getting pregnant in the future. STIs are spread during sexual contact, such as vaginal intercourse and oral or anal sex. Follow-up care is a key part of your treatment and safety. Be sure to make and go to all appointments, and call your doctor if you are having problems. It's also a good idea to know your test results and keep a list of the medicines you take. How can you care for yourself at home? · Your doctor may have given you a shot of antibiotics. If your doctor prescribed antibiotic pills, take them as directed. Do not stop taking them just because you feel better. You need to take the full course of antibiotics. · Do not have sexual contact while you have symptoms of an STI or are being treated for an STI. · Tell your sex partner (or partners) that he or she will need treatment. · If you are a woman, do not douche. Douching changes the normal balance of bacteria in the vagina and may spread an infection up into your reproductive organs. To prevent exposure to STIs in the future  · Use latex condoms every time you have sex. Use them from the beginning to the end of sexual contact. · Talk to your partner before you have sex. Find out if he or she has or is at risk for any STI. Keep in mind that a person may be able to spread an STI even if he or she does not have symptoms. · Do not have sex if you are being treated for an STI.   · Do not have sex with anyone who has symptoms of an STI, such as sores on the genitals or mouth.  · Having one sex partner (who does not have STIs and does not have sex with anyone else) is a good way to avoid STIs. When should you call for help? Call your doctor now or seek immediate medical care if:    · You have new pain in your belly or pelvis.     · You have symptoms of a urinary tract infection. These may include:  ? Pain or burning when you urinate. ? A frequent need to urinate without being able to pass much urine. ? Pain in the flank, which is just below the rib cage and above the waist on either side of the back. ? Blood in your urine. ? A fever.     · You have new or worsening pain or swelling in the scrotum. Watch closely for changes in your health, and be sure to contact your doctor if:    · You have unusual vaginal bleeding.     · You have a discharge from the vagina or penis.     · You have any new symptoms, such as sores, bumps, rashes, blisters, or warts.     · You have itching, tingling, pain, or burning in the genital or anal area.     · You think you may have an STI. Where can you learn more? Go to http://www.gray.com/  Enter M049 in the search box to learn more about \"Exposure to Sexually Transmitted Infections: Care Instructions. \"  Current as of: February 26, 2020               Content Version: 12.6  © 2006-2020 [employer]. Care instructions adapted under license by [employer] (which disclaims liability or warranty for this information). If you have questions about a medical condition or this instruction, always ask your healthcare professional. [place] [address] any warranty or liability for your use of this information. [employer] Activation    Thank you for requesting access to [employer]. Please follow the instructions below to securely access and download your online medical record.  [employer] allows you to send messages to your doctor, view your test results, renew your prescriptions, schedule appointments, and more. How Do I Sign Up? 1. In your internet browser, go to [email or web address]  2. Click on the First Time User? Click Here link in the Sign In box. You will be redirect to the New Member Sign Up page. 3. Enter your [facility] Access Code exactly as it appears below. You will not need to use this code after youve completed the sign-up process. If you do not sign up before the expiration date, you must request a new code. [facility] Access Code: [email or web address]  Expires: 2020  4:36 PM (This is the date your [facility] access code will )    4. Enter the last four digits of your Social Security Number (xxxx) and Date of Birth (mm/dd/yyyy) as indicated and click Submit. You will be taken to the next sign-up page. 5. Create a [facility] ID. This will be your [facility] login ID and cannot be changed, so think of one that is secure and easy to remember. 6. Create a [facility] password. You can change your password at any time. 7. Enter your Password Reset Question and Answer. This can be used at a later time if you forget your password. 8. Enter your e-mail address. You will receive e-mail notification when new information is available in [address] 9. Click Sign Up. You can now view and download portions of your medical record. 10. Click the Download Summary menu link to download a portable copy of your medical information. Additional Information    If you have questions, please visit the Frequently Asked Questions section of the [facility] website at [email or web address] Remember, [facility] is NOT to be used for urgent needs. For medical emergencies, dial 911.

## 2020-10-24 NOTE — ED PROVIDER NOTES
EMERGENCY DEPARTMENT HISTORY AND PHYSICAL EXAM      Date: 10/24/2020  Patient Name: Vicki Stevens    History of Presenting Illness     Chief Complaint   Patient presents with    Exposure to STD     frequent urination and dysuria       History Provided By: Patient    HPI: Vicki Stevens, 34 y.o. male presents to the ED with cc of dysuria and possible STD. Patient states he had unprotected sex and since then has had some dysuria. He admits to gonorrhea in the past.  He denies any illicit drug use, IV drug abuse and no other risk for being immune compromised. He denies any joint pain, rash, nausea, fever, vomiting or back pain. There are no other complaints, changes, or physical findings at this time. PCP: None    No current facility-administered medications on file prior to encounter. Current Outpatient Medications on File Prior to Encounter   Medication Sig Dispense Refill    famotidine (PEPCID) 20 mg tablet Take 1 Tab by mouth two (2) times a day. 20 Tab 0    ondansetron (ZOFRAN ODT) 4 mg disintegrating tablet Take 1 Tab by mouth every eight (8) hours as needed for Nausea. 10 Tab 0       Past History     Past Medical History:  No past medical history on file. Past Surgical History:  No past surgical history on file. Family History:  No family history on file. Social History:  Social History     Tobacco Use    Smoking status: Never Smoker    Smokeless tobacco: Never Used   Substance Use Topics    Alcohol use: Yes     Comment: rarely    Drug use: Yes     Types: Marijuana     Comment: 2-4 \"blunts\" a day        Allergies:  No Known Allergies      Review of Systems   Review of Systems   Constitutional: Negative. Negative for chills and fever. HENT: Negative. Negative for congestion, rhinorrhea and sinus pressure. Eyes: Negative. Negative for discharge and redness. Respiratory: Negative. Negative for chest tightness and shortness of breath. Cardiovascular: Negative. Negative for chest pain. Gastrointestinal: Negative. Negative for abdominal pain and blood in stool. Endocrine: Negative. Genitourinary: Positive for dysuria. Negative for flank pain and hematuria. Musculoskeletal: Negative. Negative for back pain. Skin: Negative. Negative for rash. Neurological: Negative. Negative for dizziness, seizures, weakness, numbness and headaches. Hematological: Negative. All other systems reviewed and are negative. Physical Exam   Physical Exam  Constitutional:       Appearance: He is normal weight. HENT:      Head: Normocephalic and atraumatic. Cardiovascular:      Rate and Rhythm: Normal rate and regular rhythm. Pulses: Normal pulses. Pulmonary:      Effort: Pulmonary effort is normal.      Breath sounds: Normal breath sounds. Abdominal:      General: Abdomen is flat. Genitourinary:     Comments: Patient declined exam  Musculoskeletal: Normal range of motion. Skin:     General: Skin is warm and dry. Capillary Refill: Capillary refill takes less than 2 seconds. Neurological:      General: No focal deficit present. Mental Status: He is alert. Diagnostic Study Results     Labs -   No results found for this or any previous visit (from the past 12 hour(s)). Radiologic Studies -   No orders to display     CT Results  (Last 48 hours)    None        CXR Results  (Last 48 hours)    None          Medical Decision Making   I am the first provider for this patient. I reviewed the vital signs, available nursing notes, past medical history, past surgical history, family history and social history. Vital Signs-Reviewed the patient's vital signs.   Patient Vitals for the past 12 hrs:   Temp Pulse Resp BP SpO2   10/24/20 1700 97.9 °F (36.6 °C) 78 15 139/80 99 %           Records Reviewed: Nursing Notes and Old Medical Records    Provider Notes (Medical Decision Making):   Differential diagnosis-urethritis, prostatitis, STI    Impression/plan-patient presents to the ER concerned about possible STD. He has had them in the past.  He admits to unprotected sex. He has dysuria. He has no other systemic symptoms. Plan will be to send for culture and treat empirically for STD. ED Course:   Initial assessment performed. The patients presenting problems have been discussed, and they are in agreement with the care plan formulated and outlined with them. I have encouraged them to ask questions as they arise throughout their visit. Dre Faye MD      Disposition:  Home        DISCHARGE PLAN:  1. Current Discharge Medication List        2. Follow-up Information     Follow up With Specialties Details Why [address] [place] [address]  Schedule an appointment as soon as possible for a visit in 3 days As needed [facility] [phone]  [phone]    [facility] EMERGENCY DEPT Emergency Medicine  If symptoms worsen [address]        3. Return to ED if worse     Diagnosis     Clinical Impression:   1. STD exposure        Attestations:    Dre Faye MD    Please note that this dictation was completed with [employer], the computer voice recognition software. Quite often unanticipated grammatical, syntax, homophones, and other interpretive errors are inadvertently transcribed by the computer software. Please disregard these errors. Please excuse any errors that have escaped final proofreading. Thank you.

## 2020-10-24 NOTE — ED NOTES
Patient presents to ED with c/o frequent urination. Concerned for std. Patient is alert and oriented x 4 and in no acute distress at this time. Respirations are at a regular rate, depth, and pattern. Patient updated on plan of care and has no questions or concerns at this time. Call bell within reach. Will continue to monitor. Please reference nursing assessment. Emergency Department Nursing Plan of Care       The Nursing Plan of Care is developed from the Nursing assessment and Emergency Department Attending provider initial evaluation. The plan of care may be reviewed in the ED Provider note.     The Plan of Care was developed with the following considerations:   Patient / Family readiness to learn indicated by:verbalized understanding and successful return demonstration  Persons(s) to be included in education: patient  Barriers to Learning/Limitations:No    Signed     Darcy Chino RN    10/24/2020   5:15 PM

## 2020-10-26 LAB
C TRACH DNA SPEC QL NAA+PROBE: NEGATIVE
N GONORRHOEA DNA SPEC QL NAA+PROBE: NEGATIVE
SAMPLE TYPE: NORMAL
SERVICE CMNT-IMP: NORMAL
SPECIMEN SOURCE: NORMAL

## 2020-12-21 ENCOUNTER — HOSPITAL ENCOUNTER (EMERGENCY)
Age: 30
Discharge: HOME | End: 2020-12-21
Attending: EMERGENCY MEDICINE

## 2020-12-21 VITALS
TEMPERATURE: 97.4 F | WEIGHT: 194 LBS | SYSTOLIC BLOOD PRESSURE: 137 MMHG | HEART RATE: 84 BPM | HEIGHT: 70 IN | OXYGEN SATURATION: 98 % | RESPIRATION RATE: 16 BRPM | DIASTOLIC BLOOD PRESSURE: 80 MMHG | BODY MASS INDEX: 27.77 KG/M2

## 2020-12-21 DIAGNOSIS — M25.512 PAIN IN JOINT OF LEFT SHOULDER: ICD-10-CM

## 2020-12-21 DIAGNOSIS — M62.838 MUSCLE SPASM: Primary | ICD-10-CM

## 2020-12-21 DIAGNOSIS — M62.838 CERVICAL PARASPINAL MUSCLE SPASM: ICD-10-CM
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
## 2020-12-21 PROCEDURE — 99283 EMERGENCY DEPT VISIT LOW MDM: CPT

## 2020-12-21 PROCEDURE — 74011250637 HC RX REV CODE- 250/637: Performed by: EMERGENCY MEDICINE

## 2020-12-21 RX ORDER — METHOCARBAMOL 500 MG/1
750 TABLET, FILM COATED ORAL
Status: COMPLETED | OUTPATIENT
Start: 2020-12-21 | End: 2020-12-21

## 2020-12-21 RX ORDER — METHOCARBAMOL 750 MG/1
750 TABLET, FILM COATED ORAL 4 TIMES DAILY
Qty: 56 TAB | Refills: 0 | Status: SHIPPED | OUTPATIENT
Start: 2020-12-21 | End: 2021-01-04

## 2020-12-21 RX ORDER — ACETAMINOPHEN 500 MG
1000 TABLET ORAL ONCE
Status: DISCONTINUED | OUTPATIENT
Start: 2020-12-21 | End: 2020-12-21 | Stop reason: HOSPADM

## 2020-12-21 RX ADMIN — METHOCARBAMOL 750 MG: 500 TABLET ORAL at 02:55
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
## 2020-12-21 NOTE — LETTER
[facility] EMERGENCY DEPT 
[address] [id number] 
[phone] Work/School Note Date: 12/21/2020 To Whom It May concern: 
 
Gold Triplett was seen and treated today in the emergency room by the following provider(s): 
Attending Provider: Yary Kingsley MD. Gold Triplett is excused from work/school on 12/21/20 and 12/22/20. He is medically clear to return to work/school on 12/23/2020. Sincerely, Rupinder Alfaro RN

## 2020-12-21 NOTE — DISCHARGE INSTRUCTIONS
PAIN CONTROL  Tylenol 1000 mg every 6 hours  Robaxin 750 mg every 6 hours as needed for breakthrough pain  Lidocaine Patch 4% (Over the counter - called Salonpas)  Heat, ice, massage

## 2020-12-21 NOTE — Clinical Note
[address] EMERGENCY DEPT 
[address] [place] [id number] 
[phone] Work/School Note Date: 12/21/2020 To Whom It May concern: 
 
Nima Kendall was seen and treated today in the emergency room by the following provider(s): 
Attending Provider: Caitlin Hayes MD. Nima Kendall is excused from work/school on 12/21/2020 through 12/24/2020. He is medically clear to return to work/school on 12/25/2020. Sincerely, Lm Mares MD
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
## 2020-12-23 ENCOUNTER — APPOINTMENT (OUTPATIENT)
Dept: GENERAL RADIOLOGY | Age: 30
End: 2020-12-23
Attending: NURSE PRACTITIONER

## 2020-12-23 ENCOUNTER — HOSPITAL ENCOUNTER (EMERGENCY)
Age: 30
Discharge: HOME | End: 2020-12-23
Attending: EMERGENCY MEDICINE

## 2020-12-23 VITALS
HEIGHT: 70 IN | WEIGHT: 195 LBS | TEMPERATURE: 98.2 F | HEART RATE: 80 BPM | BODY MASS INDEX: 27.92 KG/M2 | OXYGEN SATURATION: 98 % | RESPIRATION RATE: 17 BRPM | SYSTOLIC BLOOD PRESSURE: 150 MMHG | DIASTOLIC BLOOD PRESSURE: 88 MMHG

## 2020-12-23 DIAGNOSIS — R07.89 CHEST WALL PAIN: Primary | ICD-10-CM

## 2020-12-23 DIAGNOSIS — R07.81 RIB PAIN: ICD-10-CM

## 2020-12-23 DIAGNOSIS — S16.1XXA STRAIN OF NECK MUSCLE, INITIAL ENCOUNTER: ICD-10-CM

## 2020-12-23 PROCEDURE — 71100 X-RAY EXAM RIBS UNI 2 VIEWS: CPT

## 2020-12-23 PROCEDURE — 74011000250 HC RX REV CODE- 250: Performed by: NURSE PRACTITIONER

## 2020-12-23 PROCEDURE — 71101 X-RAY EXAM UNILAT RIBS/CHEST: CPT

## 2020-12-23 PROCEDURE — 99283 EMERGENCY DEPT VISIT LOW MDM: CPT

## 2020-12-23 RX ORDER — NAPROXEN 500 MG/1
500 TABLET ORAL
Qty: 30 TAB | Refills: 0 | Status: SHIPPED | OUTPATIENT
Start: 2020-12-23 | End: 2021-01-07

## 2020-12-23 RX ORDER — FAMOTIDINE 20 MG/1
20 TABLET, FILM COATED ORAL 2 TIMES DAILY
Qty: 30 TAB | Refills: 0 | Status: SHIPPED | OUTPATIENT
Start: 2020-12-23 | End: 2021-01-07

## 2020-12-23 RX ORDER — LIDOCAINE 4 G/100G
1 PATCH TOPICAL EVERY 24 HOURS
Status: DISCONTINUED | OUTPATIENT
Start: 2020-12-23 | End: 2020-12-24 | Stop reason: HOSPADM
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
## 2020-12-24 NOTE — DISCHARGE INSTRUCTIONS
Continue to take the Robaxin that was prescribed to you on your prior visit to help with muscle spasms and muscle pain. Try taking naproxen to help with pain and inflammation. Take this with famotidine to help prevent irritation to your stomach. [facility] SYSTEMS Departments     For adult and child immunizations, family planning, TB screening, STD testing and women's health services. [facility]: [place] [phone]      [facility] D 25   657 [facility]   [address]   170 [facility] 200 [facility] [address]  [phone]      [address]          [address] [place] [address]     For primary care services, woman and child wellness, and some clinics providing specialty care. [facility] -- [address] [facility] [address] [facility]5 [facility] [phone]   [address] [place] [address] 200 [facility] [address] [place] [address] [phone]   [address] [place] [address] [address] [place] [address] [phone] [address] [employer] [address] [place] [address] 5850  Community  [phone]   [address] [place] [address] [address] [place] [phone]   [facility] 81 [facility] [phone]   Chet Katz [address] [phone]   [facility]: [facility] 700 [facility] [phone]     [address] [phone]   [facility] 5850  Community  [phone]   [employer]  [address], [address] ([email or web address]) [phone]         Sexual Health/Woman Wellness Clinics    For STD/HIV testing and treatment, pregnancy testing and services, men's health, birth control services, LGBT services, and hepatitis/HPV vaccine services. [employer] for [employer] 201 [address]. [facility] 1900 [facility] [address] 600 [address] Sonya Rojas [phone]   [facility] [address], 5th floor [phone]   Pregnancy [address] [facility] [address]  [place] [phone]         [place] 9983 [facility] [address] [place] [address]   [phone]   [place]   [phone]   Women, Infant and Children's Services: [address] [phone]       600 [facility]   [phone]   [address]   [phone]   [facility]     [phone]   [address] Crisis   1212 [facility] [phone]     Local Primary Care Physicians  [facility] [phone]  MD Ramon Zheng MD Lotus Gammon, MD [facility] Community Doctors [phone]  Roselyn Rocha, [facility]  MD Johnson Valdez MD Eugenie Heller, MD [address] [address] [phone]  MD William Nguyen [place] [address] [facility] [address] [phone]  MD Pierre Quintana MD  [facility], MD Danya Bailey MD   [facility] [phone]  YTZO IBPFRX HR, MD Paul Rubio, NP [address] [phone]  MD Swapna Urrutia MD Alan Helm, MD Andree Cousin, MD Waymond Greulich, MD Tanis Likes, MD Demetrio Mandes, MD   763 [facility]. [address] [phone]  Abhilash Corona MD [address] [phone]  Johnathon Gómez MD  [facility], NP  Elwanda Crest, MD Ursula Goodell, MD Rudie Back, MD Shermon Brinks, MD Konrad Oakland, MD   9123 [facility] [phone]  MD Roni Gee, FNP  Leta Meier, NP  Chana Cullen, MD Curly Haro, MD JANICE Collado [facility] [phone]  MD Eliseo Reyes, MD Henok Min, MD Corry Robles, MD Martir Daigle MD   [facility] [phone]  MD Lorri Davis MD [place] [phone]  MD Alex Damon, MD Darrell Kamara, MD   [facility] [phone]  Nay Min, MD Isauro Carlos, MD Chriss Valera, MD Luz Holstein, MD Armando Mcintosh, MD Martha Todd, NP  Naz Lopez MD 1619 [facility]   [phone]  Leona Ford, MD Josh Hernandez, MD Crow Green MD   2102 [facility] [phone]  [place] [address], MD Dalila Arredondo, FNP  SARAHI Corea, SARAHI Calderon, MD Leonarda Rosa, HANK Conte, DO Miscellaneous:  Gris Moreira [phone]

## 2021-04-29 ENCOUNTER — HOSPITAL ENCOUNTER (EMERGENCY)
Age: 31
Discharge: HOME | End: 2021-04-30
Attending: EMERGENCY MEDICINE
Payer: OTHER GOVERNMENT

## 2021-04-29 DIAGNOSIS — U07.1 COVID-19: Primary | ICD-10-CM

## 2021-04-29 PROCEDURE — 99283 EMERGENCY DEPT VISIT LOW MDM: CPT

## 2021-04-29 PROCEDURE — 87636 SARSCOV2 & INF A&B AMP PRB: CPT

## 2021-04-29 PROCEDURE — 74011250637 HC RX REV CODE- 250/637: Performed by: EMERGENCY MEDICINE

## 2021-04-29 RX ORDER — IBUPROFEN 400 MG/1
800 TABLET ORAL
Status: DISCONTINUED | OUTPATIENT
Start: 2021-04-29 | End: 2021-04-30 | Stop reason: HOSPADM

## 2021-04-29 RX ORDER — ACETAMINOPHEN 500 MG
1000 TABLET ORAL ONCE
Status: COMPLETED | OUTPATIENT
Start: 2021-04-29 | End: 2021-04-29

## 2021-04-29 RX ADMIN — ACETAMINOPHEN 1000 MG: 500 TABLET, FILM COATED ORAL at 23:24

## 2021-04-29 NOTE — Clinical Note
[facility] EMERGENCY DEPT 
[address] [facility] [address] 
[phone] Work/School Note Date: 4/29/2021 To Whom It May concern: 
 
Maria Elena Fierro was evaulated by the following provider(s): 
Attending Provider: Deanna Arauz MD.   [address] virus is suspected. Per the CDC guidelines we recommend home isolation until the following conditions are all met: 1. At least 10 days have passed since symptoms first appeared and 2. At least 24 hours have passed since last fever without the use of fever-reducing medications and 
3. Symptoms (e.g., cough, shortness of breath) have improved Sincerely, Braden Eli MD

## 2021-04-30 VITALS
DIASTOLIC BLOOD PRESSURE: 96 MMHG | HEART RATE: 100 BPM | OXYGEN SATURATION: 96 % | TEMPERATURE: 98.3 F | BODY MASS INDEX: 28.35 KG/M2 | HEIGHT: 70 IN | WEIGHT: 198 LBS | RESPIRATION RATE: 16 BRPM | SYSTOLIC BLOOD PRESSURE: 131 MMHG

## 2021-04-30 LAB
FLUAV RNA SPEC QL NAA+PROBE: NOT DETECTED
FLUBV RNA SPEC QL NAA+PROBE: NOT DETECTED
SARS-COV-2, COV2: DETECTED

## 2021-04-30 RX ORDER — ONDANSETRON 4 MG/1
4 TABLET, ORALLY DISINTEGRATING ORAL
Qty: 20 TAB | Refills: 0 | Status: SHIPPED | OUTPATIENT
Start: 2021-04-30 | End: 2022-02-25

## 2021-04-30 RX ORDER — IBUPROFEN 800 MG/1
800 TABLET ORAL
Qty: 20 TAB | Refills: 0 | Status: SHIPPED | OUTPATIENT
Start: 2021-04-30 | End: 2021-05-07

## 2021-04-30 NOTE — ED NOTES
Patient c/o body aches, fever and fatigue since yesterday. + non- productive cough. Resting quietly on stretcher in NAD. Emergency Department Nursing Plan of Care       The Nursing Plan of Care is developed from the Nursing assessment and Emergency Department Attending provider initial evaluation. The plan of care may be reviewed in the ED Provider note.     The Plan of Care was developed with the following considerations:   Patient / Family readiness to learn indicated by:verbalized understanding  Persons(s) to be included in education: patient  Barriers to Learning/Limitations:No    Signed     Hanna Sim [facility]    4/29/2021   11:51 PM

## 2021-04-30 NOTE — ED NOTES
Patient  given copy of dc instructions and 1 printed script(s) (pt states he cannot take ibuprofen, so rx was shredded). Patient  verbalized understanding of instructions and script (s). Patient given a current medication reconciliation form and verbalized understanding of their medications. Patient  verbalized understanding of the importance of discussing medications with  his or her physician or clinic they will be following up with. Patient alert and oriented and in no acute distress. Patient discharged home ambulatory.

## 2021-04-30 NOTE — ED PROVIDER NOTES
54-year-old male presents with fatigue, night sweats, feeling hot, headache, back pain, decreased appetite, cough productive of yellow phlegm, sore throat, loss of taste and smell, decreased appetite, belly pain, increased bowel movements which began yesterday. No past medical history on file. No past surgical history on file. No family history on file. Social History     Socioeconomic History    Marital status: SINGLE     Spouse name: Not on file    Number of children: Not on file    Years of education: Not on file    Highest education level: Not on file   Occupational History    Not on file   Social Needs    Financial resource strain: Not on file    Food insecurity     Worry: Not on file     Inability: Not on file    Transportation needs     Medical: Not on file     Non-medical: Not on file   Tobacco Use    Smoking status: Never Smoker    Smokeless tobacco: Never Used   Substance and Sexual Activity    Alcohol use: Yes     Comment: rarely    Drug use: Yes     Types: Marijuana     Comment: 2-4 \"blunts\" a day     Sexual activity: Yes     Partners: Female     Birth control/protection: Condom   Lifestyle    Physical activity     Days per week: Not on file     Minutes per session: Not on file    Stress: Not on file   Relationships    Social connections     Talks on phone: Not on file     Gets together: Not on file     Attends Catholic service: Not on file     Active member of club or organization: Not on file     Attends meetings of clubs or organizations: Not on file     Relationship status: Not on file    Intimate partner violence     Fear of current or ex partner: Not on file     Emotionally abused: Not on file     Physically abused: Not on file     Forced sexual activity: Not on file   Other Topics Concern    Not on file   Social History Narrative    Not on file         ALLERGIES: Patient has no known allergies.     Review of Systems   Constitutional: Positive for appetite change, diaphoresis, fatigue and fever. HENT: Positive for congestion, rhinorrhea and sore throat. Negative for drooling, facial swelling and trouble swallowing. Eyes: Negative. Negative for discharge and redness. Respiratory: Positive for cough. Negative for chest tightness, shortness of breath and wheezing. Cardiovascular: Negative. Negative for chest pain. Gastrointestinal: Positive for abdominal pain. Negative for abdominal distention, constipation, diarrhea, nausea and vomiting. Endocrine: Negative. Genitourinary: Negative. Negative for difficulty urinating and dysuria. Musculoskeletal: Positive for back pain. Negative for arthralgias and myalgias. Skin: Negative. Negative for color change and rash. Allergic/Immunologic: Negative. Neurological: Positive for headaches. Negative for syncope, facial asymmetry and speech difficulty. Hematological: Negative. Psychiatric/Behavioral: Negative. Negative for agitation and confusion. All other systems reviewed and are negative. Vitals:    04/29/21 2251   BP: (!) 131/96   Pulse: 100   Resp: 16   Temp: (!) 100.7 °F (38.2 °C)   SpO2: 96%   Weight: 89.8 kg (198 lb)   Height: 5' 10\" (1.778 m)            Physical Exam  Vitals signs and nursing note reviewed. Constitutional:       Appearance: Normal appearance. He is well-developed. HENT:      Head: Normocephalic and atraumatic. Eyes:      Conjunctiva/sclera: Conjunctivae normal.   Neck:      Musculoskeletal: Neck supple. Cardiovascular:      Rate and Rhythm: Normal rate and regular rhythm. Pulmonary:      Effort: No accessory muscle usage or respiratory distress. Abdominal:      Palpations: Abdomen is soft. Tenderness: There is no abdominal tenderness. Musculoskeletal: Normal range of motion. Skin:     General: Skin is warm and dry. Neurological:      Mental Status: He is alert and oriented to person, place, and time.    Psychiatric:         Behavior: Behavior normal.         Thought Content: Thought content normal.          MDM  Number of Diagnoses or Management Options  COVID-19  Diagnosis management comments: Viral constellation of symptoms. Plan: Covid test, and supportive care         Procedures    LABORATORY TESTS:  Recent Results (from the past 12 hour(s))   COVID-19 WITH INFLUENZA A/B    Collection Time: 04/29/21 11:27 PM   Result Value Ref Range    SARS-CoV-2 Detected (AA) NOTD      Influenza A by PCR Not detected NOTD      Influenza B by PCR Not detected NOTD         IMAGING RESULTS:  No orders to display       MEDICATIONS GIVEN:  Medications   acetaminophen (TYLENOL) tablet 1,000 mg (1,000 mg Oral Given 4/29/21 8355)       IMPRESSION:  1. COVID-19        PLAN:  1. Discharge Medication List as of 4/30/2021 12:38 AM      START taking these medications    Details   ibuprofen (MOTRIN) 800 mg tablet Take 1 Tab by mouth every six (6) hours as needed for Pain for up to 7 days. , Print, Disp-20 Tab, R-0      !! ondansetron (Zofran ODT) 4 mg disintegrating tablet Take 1 Tab by mouth every eight (8) hours as needed for Nausea. , Print, Disp-20 Tab, R-0       !! - Potential duplicate medications found. Please discuss with provider. CONTINUE these medications which have NOT CHANGED    Details   famotidine (PEPCID) 20 mg tablet Take 1 Tab by mouth two (2) times a day., Normal, Disp-20 Tab, R-0      !! ondansetron (ZOFRAN ODT) 4 mg disintegrating tablet Take 1 Tab by mouth every eight (8) hours as needed for Nausea., Normal, Disp-10 Tab, R-0       !! - Potential duplicate medications found. Please discuss with provider.         2.   Follow-up Information     Follow up With Specialties Details Why [address] [facility] [address]   to establish primary care as needed [address], [address]  [phone]        Return to ED if worse

## 2021-05-01 ENCOUNTER — PATIENT OUTREACH (OUTPATIENT)
Dept: CASE MANAGEMENT | Age: 31
End: 2021-05-01

## 2021-05-01 NOTE — PROGRESS NOTES
Patient contacted regarding [id number]-58 diagnosis\". Discussed COVID-19 related testing which was available at this time. Test results were positive. Patient informed of results, if available? yes     Care Transition Nurse contacted the patient by telephone to perform post discharge assessment. Call within 2 business days of discharge: Yes Verified name and  with patient as identifiers. Provided introduction to self, and explanation of the CTN/ACM role, and reason for call due to risk factors for infection and/or exposure to COVID-19. Symptoms reviewed with patient who verbalized the following symptoms: pain or aching joints, cough, shortness of breath, loss or taste or smell, sweating, chest pain, no new symptoms and headache      Due to no new or worsening symptoms encounter was not routed to provider for escalation. Discussed follow-up appointments. If no appointment was previously scheduled, appointment scheduling offered:  Yes, but patient does not have health insurance. Emailed patient a list of free clinics in [place]. [address] [place] [address]  follow up appointment(s): No future appointments. Non[facility] follow up appointment(s): TBD     Advance Care Planning:   Does patient have an Advance Directive:  not on file. Patient has following risk factors of: no known risk factors. CTN reviewed discharge instructions, medical action plan and red flags such as increased shortness of breath, increasing fever and signs of decompensation with patient who verbalized understanding. Discussed exposure protocols and quarantine with CDC Guidelines What to do if you are sick with coronavirus disease .  Patient was given an opportunity for questions and concerns. The patient agrees to contact the [facility] exposure line [phone], local [facility] department [facility]  [phone] and PCP office for questions related to their healthcare. CTN provided contact information for future needs.     Reviewed and educated patient on any new and changed medications related to discharge diagnosis     Was patient discharged with a pulse oximeter? no       Plan for follow up call within the next 14 days based on severity of symptoms and risk factors.

## 2021-05-22 ENCOUNTER — PATIENT OUTREACH (OUTPATIENT)
Dept: CASE MANAGEMENT | Age: 31
End: 2021-05-22

## 2021-05-22 NOTE — PROGRESS NOTES
Patient resolved from [address] episode on 21. CTN contacted the patient and introduced self and reason for call. He states \"I'm doing great. \" When he was asked to verify his , he disconnected the call. Patient/family has been provided the following resources and education related to COVID-19:                         Signs, symptoms and red flags related to COVID-19            CDC exposure and quarantine guidelines            [facility] exposure contact - [phone]            Contact for their local Department of Health                 Patient currently reports that the following symptoms have improved:  no new symptoms and no worsening symptoms. No further outreach scheduled with this CTN. Episode of Care resolved. Patient has this CTN contact information if future needs arise.

## 2021-05-29 ENCOUNTER — HOSPITAL ENCOUNTER (EMERGENCY)
Age: 31
Discharge: HOME | End: 2021-05-29
Attending: EMERGENCY MEDICINE

## 2021-05-29 VITALS
TEMPERATURE: 97.5 F | DIASTOLIC BLOOD PRESSURE: 80 MMHG | RESPIRATION RATE: 20 BRPM | HEART RATE: 90 BPM | BODY MASS INDEX: 28.92 KG/M2 | OXYGEN SATURATION: 99 % | WEIGHT: 202 LBS | HEIGHT: 70 IN | SYSTOLIC BLOOD PRESSURE: 137 MMHG

## 2021-05-29 DIAGNOSIS — R10.12 ABDOMINAL PAIN, LUQ (LEFT UPPER QUADRANT): Primary | ICD-10-CM

## 2021-05-29 DIAGNOSIS — Z71.1 CONCERN ABOUT STD IN MALE WITHOUT DIAGNOSIS: ICD-10-CM

## 2021-05-29 LAB
APPEARANCE UR: CLEAR
BACTERIA URNS QL MICRO: NEGATIVE /HPF
BILIRUB UR QL: NEGATIVE
COLOR UR: ABNORMAL
EPITH CASTS URNS QL MICRO: ABNORMAL /LPF
GLUCOSE UR STRIP.AUTO-MCNC: NEGATIVE MG/DL
HGB UR QL STRIP: NEGATIVE
KETONES UR QL STRIP.AUTO: NEGATIVE MG/DL
LEUKOCYTE ESTERASE UR QL STRIP.AUTO: ABNORMAL
NITRITE UR QL STRIP.AUTO: NEGATIVE
PH UR STRIP: 6 [PH] (ref 5–8)
PROT UR STRIP-MCNC: NEGATIVE MG/DL
RBC #/AREA URNS HPF: ABNORMAL /HPF (ref 0–5)
SP GR UR REFRACTOMETRY: 1.02 (ref 1–1.03)
UA: UC IF INDICATED,UAUC: ABNORMAL
UROBILINOGEN UR QL STRIP.AUTO: 1 EU/DL (ref 0.2–1)
WBC URNS QL MICRO: ABNORMAL /HPF (ref 0–4)

## 2021-05-29 PROCEDURE — 87491 CHLMYD TRACH DNA AMP PROBE: CPT

## 2021-05-29 PROCEDURE — 74011250637 HC RX REV CODE- 250/637: Performed by: EMERGENCY MEDICINE

## 2021-05-29 PROCEDURE — 81001 URINALYSIS AUTO W/SCOPE: CPT

## 2021-05-29 PROCEDURE — 74011000250 HC RX REV CODE- 250: Performed by: EMERGENCY MEDICINE

## 2021-05-29 PROCEDURE — 99283 EMERGENCY DEPT VISIT LOW MDM: CPT

## 2021-05-29 PROCEDURE — 96372 THER/PROPH/DIAG INJ SC/IM: CPT

## 2021-05-29 PROCEDURE — 74011250636 HC RX REV CODE- 250/636: Performed by: EMERGENCY MEDICINE

## 2021-05-29 RX ORDER — AZITHROMYCIN 500 MG/1
1000 TABLET, FILM COATED ORAL DAILY
Status: DISCONTINUED | OUTPATIENT
Start: 2021-05-29 | End: 2021-05-29 | Stop reason: HOSPADM

## 2021-05-29 RX ADMIN — AZITHROMYCIN MONOHYDRATE 1000 MG: 500 TABLET ORAL at 05:10

## 2021-05-29 RX ADMIN — LIDOCAINE HYDROCHLORIDE 500 MG: 10 INJECTION, SOLUTION EPIDURAL; INFILTRATION; INTRACAUDAL; PERINEURAL at 05:11

## 2021-05-29 NOTE — ED PROVIDER NOTES
EMERGENCY DEPARTMENT HISTORY AND PHYSICAL EXAM      Date: 5/29/2021  Patient Name: Quinton Hammond    History of Presenting Illness     Chief Complaint   Patient presents with    Abdominal Pain       History Provided By: Patient    HPI: Quinton Hammond, 27 y.o. male presents to the ED with cc of abdominal pain. 43-year-old male presents emergency department with abdominal pain. Initially patient reports left upper quadrant pain that began 2 days ago, and is intermittent, without alleviating or aggravating factors. Is not radiating. Patient cannot characterize the pain. No new medications. No nausea vomiting or diarrhea. After nursing staff to the room patient states \"look I actually just want to be checked for sexually transmitted diseases. \". He does report some penile discharge, dysuria. No hematuria. There are no other complaints, changes, or physical findings at this time. PCP: None    No current facility-administered medications on file prior to encounter. Current Outpatient Medications on File Prior to Encounter   Medication Sig Dispense Refill    ondansetron (Zofran ODT) 4 mg disintegrating tablet Take 1 Tab by mouth every eight (8) hours as needed for Nausea. 20 Tab 0    famotidine (PEPCID) 20 mg tablet Take 1 Tab by mouth two (2) times a day. 20 Tab 0    ondansetron (ZOFRAN ODT) 4 mg disintegrating tablet Take 1 Tab by mouth every eight (8) hours as needed for Nausea. 10 Tab 0       Past History     Past Medical History:  History reviewed. No pertinent past medical history. Past Surgical History:  History reviewed. No pertinent surgical history. Family History:  History reviewed. No pertinent family history.     Social History:  Social History     Tobacco Use    Smoking status: Never Smoker    Smokeless tobacco: Never Used   Substance Use Topics    Alcohol use: Yes     Comment: rarely    Drug use: Yes     Types: Marijuana     Comment: 2-4 \"blunts\" a day Allergies:  No Known Allergies      Review of Systems   Review of Systems   Constitutional: Negative for fever. HENT: Negative for voice change. Eyes: Negative for pain and redness. Respiratory: Negative for cough and chest tightness. Cardiovascular: Negative for chest pain and leg swelling. Gastrointestinal: Negative for abdominal pain, diarrhea, nausea and vomiting. Genitourinary: Positive for discharge and dysuria. Negative for hematuria. Musculoskeletal: Negative for gait problem. Skin: Negative for color change, pallor and rash. Neurological: Negative for facial asymmetry, weakness and headaches. Hematological: Does not bruise/bleed easily. Psychiatric/Behavioral: Negative for behavioral problems. All other systems reviewed and are negative. Physical Exam   Physical Exam  Vitals and nursing note reviewed. Constitutional:       Comments: 80-year-old male, resting in stretcher, no acute distress   HENT:      Head: Normocephalic. Right Ear: External ear normal.      Left Ear: External ear normal.      Nose: Nose normal.   Eyes:      Conjunctiva/sclera: Conjunctivae normal.   Cardiovascular:      Rate and Rhythm: Normal rate and regular rhythm. Heart sounds: No murmur heard. No friction rub. No gallop. Pulmonary:      Effort: Pulmonary effort is normal.      Breath sounds: Normal breath sounds. No wheezing, rhonchi or rales. Abdominal:      Palpations: Abdomen is soft. Tenderness: There is abdominal tenderness in the left upper quadrant. Musculoskeletal:         General: Normal range of motion. Skin:     General: Skin is warm. Capillary Refill: Capillary refill takes less than 2 seconds. Neurological:      Mental Status: He is alert. Mental status is at baseline.    Psychiatric:         Mood and Affect: Mood normal.         Behavior: Behavior normal.         Diagnostic Study Results     Labs -     Recent Results (from the past 12 hour(s)) URINALYSIS W/ REFLEX CULTURE    Collection Time: 05/29/21  5:07 AM    Specimen: Urine   Result Value Ref Range    Color YELLOW/STRAW      Appearance CLEAR CLEAR      Specific gravity 1.020 1.003 - 1.030      pH (UA) 6.0 5.0 - 8.0      Protein Negative NEG mg/dL    Glucose Negative NEG mg/dL    Ketone Negative NEG mg/dL    Bilirubin Negative NEG      Blood Negative NEG      Urobilinogen 1.0 0.2 - 1.0 EU/dL    Nitrites Negative NEG      Leukocyte Esterase TRACE (A) NEG      WBC 0-4 0 - 4 /hpf    RBC 0-5 0 - 5 /hpf    Epithelial cells FEW FEW /lpf    Bacteria Negative NEG /hpf    UA:UC IF INDICATED CULTURE NOT INDICATED BY UA RESULT CNI         Radiologic Studies -   No orders to display     CT Results  (Last 48 hours)    None        CXR Results  (Last 48 hours)    None          Medical Decision Making   I am the first provider for this patient. I reviewed the vital signs, available nursing notes, past medical history, past surgical history, family history and social history. Vital Signs-Reviewed the patient's vital signs. Patient Vitals for the past 12 hrs:   Temp Pulse Resp BP SpO2   05/29/21 0436 97.5 °F (36.4 °C) 90 20 137/80 99 %     Records Reviewed: Nursing Notes    Provider Notes (Medical Decision Making):     80-year-old male presents emergency department with a chief complaint of left upper quadrant pain. After nursing completing triage left the room, patient states that he just wants to be checked for STDs. Offered patient further work-up for his left upper quadrant pain, but patient states that is unnecessary. I am not sure that this is actually a true presenting complaint, and therefore will not provide further ED work-up. Regarding patient's symptoms with questionable dysuria and penile discharge, will treat empirically for gonorrhea/chlamydia. Check urinalysis and urine gonorrhea and chlamydia as differential includes trichomonas, UTI, urethritis.     ED Course:   Initial assessment performed. The patients presenting problems have been discussed, and they are in agreement with the care plan formulated and outlined with them. I have encouraged them to ask questions as they arise throughout their visit. ED Course as of May 29 0554   Sat May 29, 2021   0532 UA shows sterile pyuria consistent with urethritis. Patient treated empirically. [MB]   5161 Patient left ED prior to receiving paperwork. [MB]      ED Course User Index  [MB] MD Paty Vega MD      Disposition:    Discharged      DISCHARGE PLAN:  1. Current Discharge Medication List        2. Follow-up Information     Follow up With Specialties Details Why [address] [facility] EMERGENCY DEPT Emergency Medicine In 3 days  [address]  In 1 week  981 \[facility] [place] [facility]\"" [address]        3. Return to ED if worse     Diagnosis     Clinical Impression:   1. Abdominal pain, LUQ (left upper quadrant)    2. Concern about STD in male without diagnosis        Attestations:    Paty Goddard MD    Please note that this dictation was completed with [employer], the [employer] voice recognition software. Quite often unanticipated grammatical, syntax, homophones, and other interpretive errors are inadvertently transcribed by the computer software. Please disregard these errors. Please excuse any errors that have escaped final proofreading. Thank you.

## 2021-05-29 NOTE — LETTER
5/30/2021 Ismael Maki [facility] Dear Mr. David Wang You were seen in the Emergency Department of [facility] on 5-39 and had lab and/or radiology tests performed. We would like to discuss these results with you . Please call the Emergency Department at your earliest convenience at [phone], to speak with one of our providers. The chlamydia from your Emergency Department visit on 5-29 was positive. You were treated appropriately during your visit. No further treatment is required. Your partner needs to be treated. If you have any questions please contact the Emergency Department at [phone]. Sincerely, HANK Preston 83 
[facility] EMERGENCY DEPT 
[address] [place] [address] [phone]

## 2021-05-29 NOTE — ED NOTES
Pt in ED w/ complaint of intermittent mid L sided abd pain X 2 days. Pt denies any N/V/D, dysuria, and constipation. Pt did not report to this RN but admitted to the provider that he is concerned for STDs. Pt is A&O X 4 and appears to be in no distress. Emergency Department Nursing Plan of Care       The Nursing Plan of Care is developed from the Nursing assessment and Emergency Department Attending provider initial evaluation. The plan of care may be reviewed in the ED Provider note.     The Plan of Care was developed with the following considerations:   Patient / Family readiness to learn indicated by:verbalized understanding  Persons(s) to be included in education: patient  Barriers to Learning/Limitations:No    Signed     Fer Escalante RN    5/29/2021   5:24 AM

## 2021-05-30 LAB
C TRACH RRNA SPEC QL NAA+PROBE: POSITIVE
N GONORRHOEA RRNA SPEC QL NAA+PROBE: NEGATIVE
PLEASE NOTE:, 188601: ABNORMAL
SPECIMEN SOURCE: ABNORMAL

## 2021-08-25 ENCOUNTER — OFFICE VISIT (OUTPATIENT)
Dept: URGENT CARE | Age: 31
End: 2021-08-25

## 2021-08-25 VITALS — TEMPERATURE: 98.6 F | OXYGEN SATURATION: 95 % | RESPIRATION RATE: 18 BRPM | HEART RATE: 86 BPM

## 2021-08-25 DIAGNOSIS — Z20.822 ENCOUNTER FOR LABORATORY TESTING FOR COVID-19 VIRUS: Primary | ICD-10-CM

## 2021-08-25 LAB — SARS-COV-2 POC: NEGATIVE

## 2021-08-25 PROCEDURE — 87426 SARSCOV CORONAVIRUS AG IA: CPT | Performed by: FAMILY MEDICINE

## 2021-08-25 PROCEDURE — 99211 OFF/OP EST MAY X REQ PHY/QHP: CPT | Performed by: FAMILY MEDICINE

## 2022-01-04 ENCOUNTER — HOSPITAL ENCOUNTER (EMERGENCY)
Age: 32
Discharge: HOME | End: 2022-01-04
Attending: EMERGENCY MEDICINE

## 2022-01-04 VITALS
DIASTOLIC BLOOD PRESSURE: 72 MMHG | SYSTOLIC BLOOD PRESSURE: 140 MMHG | RESPIRATION RATE: 17 BRPM | TEMPERATURE: 98.9 F | WEIGHT: 199 LBS | BODY MASS INDEX: 28.49 KG/M2 | HEIGHT: 70 IN | HEART RATE: 87 BPM | OXYGEN SATURATION: 96 %

## 2022-01-04 DIAGNOSIS — Z20.822 SUSPECTED COVID-19 VIRUS INFECTION: Primary | ICD-10-CM

## 2022-01-04 LAB
SARS-COV-2, XPLCVT: DETECTED
SOURCE, COVRS: ABNORMAL

## 2022-01-04 PROCEDURE — 75810000275 HC EMERGENCY DEPT VISIT NO LEVEL OF CARE

## 2022-01-04 PROCEDURE — U0005 INFEC AGEN DETEC AMPLI PROBE: HCPCS

## 2022-01-04 PROCEDURE — 99282 EMERGENCY DEPT VISIT SF MDM: CPT

## 2022-01-04 RX ORDER — NAPROXEN 500 MG/1
500 TABLET ORAL 2 TIMES DAILY WITH MEALS
Qty: 20 TABLET | Refills: 0 | Status: SHIPPED | OUTPATIENT
Start: 2022-01-04 | End: 2022-02-25

## 2022-01-04 NOTE — ED NOTES
Emergency Department Nursing Plan of Care       The Nursing Plan of Care is developed from the Nursing assessment and Emergency Department Attending provider initial evaluation. The plan of care may be reviewed in the ED Provider note.     The Plan of Care was developed with the following considerations:   Patient / Family readiness to learn indicated by:verbalized understanding  Persons(s) to be included in education: patient  Barriers to Learning/Limitations:No    Signed     Yinka Tipton    1/4/2022   1:56 AM

## 2022-01-04 NOTE — Clinical Note
[facility] EMERGENCY DEPT  [address] [facility] [address] [phone]    Work/School Note    Date: 1/4/2022     To Whom It May concern:    Pat Jay was evaluated by the following provider(s):  Attending Provider: Jefry Crow MD.   Lew Dariel virus is suspected. Per the CDC guidelines we recommend home isolation until the following conditions are all met:    1. At least five days have passed since symptoms first appeared and/or had a close exposure,   2. After home isolation for five days, wearing a mask around others for the next five days,  3. At least 24 have passed since last fever without the use of fever-reducing medications and  4.  Symptoms (eg cough, shortness of breath) have improved      Sincerely,          Alfa Calvo MD

## 2022-01-04 NOTE — DISCHARGE INSTRUCTIONS
Thank You! It was a pleasure taking care of you in our Emergency Department today. We know that when you come to [address] [place] [address], you are entrusting us with your health, comfort, and safety. Our physicians and nurses honor that trust, and truly appreciate the opportunity to care for you and your loved ones. We also value your feedback. If you receive a survey about your Emergency Department experience today, please fill it out. We care about our patients' feedback, and we listen to what you have to say. Thank you. Dr. Corie Kitchen M.D.      ____________________________________________________________________  I have included a copy of your lab results and/or radiologic studies from today's visit so you can have them easily available at your follow-up visit. We hope you feel better and please do not hesitate to contact the ED if you have any questions at all! No results found for this or any previous visit (from the past 12 hour(s)). No orders to display     CT Results  (Last 48 hours)      None          The exam and treatment you received in the Emergency Department were for an urgent problem and are not intended as complete care. It is important that you follow up with a doctor, nurse practitioner, or physician assistant for ongoing care. If your symptoms become worse or you do not improve as expected and you are unable to reach your usual health care provider, you should return to the Emergency Department. We are available 24 hours a day. Please take your discharge instructions with you when you go to your follow-up appointment. If a prescription has been provided, please have it filled as soon as possible to prevent a delay in treatment. Read the entire medication instruction sheet provided to you by the pharmacy.  If you have any questions or reservations about taking the medication due to side effects or interactions with other medications, please call your primary care physician or contact the ER to speak with the charge nurse. Please make an appointment with your family doctor or the physician you were referred to for follow-up of this visit as instructed on your discharge paperwork. Return to the ER if you are unable to be seen or if you are unable to be seen in a timely manner. If you have any problem arranging the follow-up visit, contact the Emergency Department immediately.

## 2022-01-04 NOTE — ED PROVIDER NOTES
EMERGENCY DEPARTMENT HISTORY AND PHYSICAL EXAM      Please note that this dictation was completed with the assistance of \"Dragon\", the computer voice recognition software. Quite often unanticipated grammatical, syntax, homophones, and other interpretive errors are inadvertently transcribed by the computer software. Please disregard these errors and any errors that have escaped final proofreading. Thank you. Patient: Irene Nunez  DOS: 22  : 1990  MRN: [id number]  History of Presenting Illness     Chief Complaint   Patient presents with    Concern For COVID-19 (Coronavirus)     History Provided By: Patient/family/EMS (if applicable)    HPI: Irene Nunez, 32 y.o. male with past medical history as documented below presents to the ED with c/o of 2 days of body aches, chills, dry cough and nasal congestion. Reports concern for COVID-19 and has not been vaccinated. He has tried OTC flu and cold meds without relief. Pt denies any other exacerbating or ameliorating factors. Additionally, pt specifically denies any recent fever, headache, nausea, vomiting, abdominal pain, CP, SOB, lightheadedness, dizziness, numbness, weakness, lower extremity swelling, heart palpitations, urinary sxs, diarrhea, constipation, melena, hematochezia. There are no other complaints, changes or physical findings pertinent to the HPI at this time. PCP: Unknown, Provider, DPM  Past History   Past Medical History:  History reviewed. No pertinent past medical history. Past Surgical History:  Denies    Family History:   Family history reviewed and was non-contributory, unless specified below:  History reviewed. No pertinent family history.     Social History:  Social History     Tobacco Use    Smoking status: Never Smoker    Smokeless tobacco: Never Used   Substance Use Topics    Alcohol use: Yes     Comment: rarely    Drug use: Yes     Types: Marijuana     Comment: 2-4 \"blunts\" a day        Allergies:  No Known Allergies    Current Medications:  No current facility-administered medications on file prior to encounter. Current Outpatient Medications on File Prior to Encounter   Medication Sig Dispense Refill    ondansetron (Zofran ODT) 4 mg disintegrating tablet Take 1 Tab by mouth every eight (8) hours as needed for Nausea. (Patient not taking: Reported on 8/25/2021) 20 Tab 0    famotidine (PEPCID) 20 mg tablet Take 1 Tab by mouth two (2) times a day. (Patient not taking: Reported on 1/4/2022) 20 Tab 0    ondansetron (ZOFRAN ODT) 4 mg disintegrating tablet Take 1 Tab by mouth every eight (8) hours as needed for Nausea. (Patient not taking: Reported on 1/4/2022) 10 Tab 0     Review of Systems   A complete ROS was reviewed by me today and all other systems negative, unless otherwise specified below:  Review of Systems   Constitutional: Positive for chills. Negative for fever. HENT: Positive for congestion. Negative for sore throat. Eyes: Negative. Respiratory: Positive for cough. Negative for chest tightness, shortness of breath and wheezing. Cardiovascular: Negative. Negative for chest pain, palpitations and leg swelling. Gastrointestinal: Negative. Negative for abdominal distention, abdominal pain, blood in stool, constipation, diarrhea, nausea and vomiting. Endocrine: Negative. Genitourinary: Negative. Negative for dysuria, flank pain, frequency, hematuria and urgency. Musculoskeletal: Negative. Negative for arthralgias, back pain and myalgias. Skin: Negative. Negative for color change and rash. Neurological: Negative. Negative for dizziness, syncope, speech difficulty, weakness, light-headedness, numbness and headaches. Hematological: Negative. Psychiatric/Behavioral: Negative. Negative for confusion and self-injury. The patient is not nervous/anxious. All other systems reviewed and are negative. Physical Exam   Physical Exam  Vitals and nursing note reviewed. Constitutional:       Appearance: He is well-developed. He is not toxic-appearing. HENT:      Head: Normocephalic and atraumatic. Mouth/Throat:      Pharynx: No posterior oropharyngeal erythema. Eyes:      Conjunctiva/sclera: Conjunctivae normal.   Cardiovascular:      Rate and Rhythm: Normal rate and regular rhythm. Heart sounds: Normal heart sounds. No murmur heard. No friction rub. No gallop. Pulmonary:      Effort: Pulmonary effort is normal. No respiratory distress. Breath sounds: Normal breath sounds. No wheezing or rales. Chest:      Chest wall: No tenderness. Abdominal:      General: Bowel sounds are normal. There is no distension. Palpations: Abdomen is soft. There is no mass. Tenderness: There is no abdominal tenderness. There is no guarding or rebound. Musculoskeletal:         General: Normal range of motion. Cervical back: Normal range of motion. Skin:     General: Skin is warm. Neurological:      General: No focal deficit present. Mental Status: He is alert and oriented to person, place, and time. Motor: No abnormal muscle tone. Psychiatric:         Behavior: Behavior is cooperative. Diagnostic Study Results     Laboratory Data  I have personally reviewed and interpreted all available laboratory results. No results found for this or any previous visit (from the past 24 hour(s)). Radiologic Studies   I have personally reviewed and interpreted all available imaging studies and agree with radiology interpretation. No orders to display     CT Results  (Last 48 hours)    None        CXR Results  (Last 48 hours)    None        Medical Decision Making   I am the first and primary ED physician for this patient's ED visit today.     I reviewed our electronic medical record system for any past medical records that may contribute to the patient's current condition, including their past medical history, surgical history, social and family history. This also includes their most recent ED visits, previous hospitalizations and prior diagnostic data. I have reviewed and summarized the most pertinent findings in my HPI and MDM. Vital Signs Reviewed:  Patient Vitals for the past 24 hrs:   Temp Pulse Resp BP SpO2   01/04/22 0115 98.9 °F (37.2 °C) 87 17 (!) 140/72 96 %       Records Reviewed: Nursing Notes, Old Medical Records, Previous electrocardiograms, Previous Radiology Studies and Previous Laboratory Studies, EMS reports    Provider Notes (Medical Decision Making):   Pt presents with acute URI symptoms including nasal congestion, rhinorrhea and sore throat. Pt also has c/o of cough without dyspnea, chest pain or wheezing. Pt is well-appearing with stable vitals and benign exam; symptoms are consistent with an uncomplicated URI. DDx: COVID-19, acute bronchitis, bacterial sinusitis vs. pharyngitis, migraine, flu. Symptomatic therapy suggested: acetaminophen, ibuprofen, antihistamine-decongestant of choice, cough suppressant of choice. Increase fluids, use vaporizer, stay in steamy bathroom tid 15 min prn severe cough, tylenol as needed, rest, avoid smoky areas. Lack of antibiotic effectiveness discussed with her. Symptomatic therapy suggested: gargle for sore throat, use mist at bedside for congestion. Apply facial warm packs for sinus pain or use nasal saline sprays. Follow up prn if not better in 72 hours. ED Course:   Initial assessment performed. I discussed presenting problems and concerns, and my formulated plan for today's visit with the patient and any available family members. I have encouraged them to ask questions as they arise throughout the visit.    Social History     Tobacco Use    Smoking status: Never Smoker    Smokeless tobacco: Never Used   Substance Use Topics    Alcohol use: Yes     Comment: rarely    Drug use: Yes     Types: Marijuana     Comment: 2-4 \"blunts\" a day        ED Orders Placed:  Orders Placed This Encounter    SARS-COV-2    naproxen (NAPROSYN) 500 mg tablet       ED Medications Administered During ED Course:  Medications - No data to display      Progress Note:  Given concerns for COVID-19 infection in this patient, I spent extra time to ensure proper and full PPE was used for the initial assessment and for subsequent patient encounters for updates and reassessments. This was done to help combat the transmission of the virus to myself and other patients and staff in the emergency department. Progress note:  Pt notes feeling better after ED treatment. Pt ambulated with pulse ox with saturations maintain > 92% and tolerated well. Discussed lab and imaging findings with pt, specifically noting possible COVID-19 infection. Patient instructed on proper hygiene and self-quarantine, as well as lying prone for 3 hours a day. Pt instructed to self-isolate at home until 3 days after symptoms have resolved AND 7 days after symptoms first started, whichever is later. Provided with [facility] [address] handout for likely COVID infection recommendations. Pt will follow up with health department or this emergency department immediately should symptoms worsen at any time as instructed. All questions have been answered, pt voiced understanding and agreement with plan. Progress Note:  I have just re-evaluated the patient. Patient reports improvement of sx's. I have reviewed His vital signs and determined there is currently no worsening in their condition or physical exam. Results have been reviewed with them and their questions have been answered. We will continue to review further results as they come available. Progress Note:  Pt reassessed and symptoms noted to have improved significantly after ED treatment. Pt is clinically stable for discharge. Ismael Maki's labs and imaging have been reviewed with him and available family.  He verbally conveys understanding and agreement of the signs, symptoms, diagnosis, treatment and prognosis and additionally agrees to follow up as recommended with Dr. Farnaz Zhang, Provider, DPM and/or specialist as instructed. He agrees with the care plan we have created and conveys that all of his questions have been answered. Additionally, I have put together a packet of discharge instructions for him that include: 1) educational information regarding their diagnosis, 2) how to care for their diagnosis at home, as well a 3) list of reasons why they would want to return to the ED prior to their follow-up appointment should the patient's condition change or symptoms worsen. I have answered all questions to the patient's satisfaction. Strict return precautions given. He conveyed understanding and agreement with care plan. Vital signs stable for discharge. Disposition:  DISCHARGE  The pt is ready for discharge. The pt's signs, symptoms, diagnosis, and discharge instructions have been discussed and pt has conveyed their understanding. The pt is to follow up as recommended or return to ER should their symptoms worsen. Plan has been discussed and pt is in agreement. Plan:  1. Return precautions as discussed with patient and available family/caregiver. 2.   Discharge Medication List as of 1/4/2022  2:27 AM        3. Follow-up Information     Follow up With Specialties Details Why [address] [facility] EMERGENCY DEPT Emergency Medicine  As needed, If symptoms worsen [address]        Instructed to return to ED if worse  Diagnosis   Clinical Impression:  1. Suspected COVID-19 virus infection      Attestation:  Marleny Celeste MD, am the attending of record for this patient. I personally performed the services described in this documentation on this date, 1/4/2022 for patient, Keli Lugo. I have reviewed the chart and verified that the record is accurate and complete.

## 2022-01-04 NOTE — ED TRIAGE NOTES
Pt reporting COVID-like symptoms including chills, generalized body aches, back pain, mild shortness of breath intermittently x 2days.

## 2022-01-04 NOTE — Clinical Note
[facility] EMERGENCY DEPT  [address] [facility] [address] [phone]    Work/School Note    Date: 1/4/2022     To Whom It May concern:    Jacob Martines was evaluated by the following provider(s):  Attending Provider: Adolfo Trinidad MD.   [address] [facility] [place] virus is suspected. Per the CDC guidelines we recommend home isolation until the following conditions are all met:    1. At least five days have passed since symptoms first appeared and/or had a close exposure,   2. After home isolation for five days, wearing a mask around others for the next five days,  3. At least 24 have passed since last fever without the use of fever-reducing medications and  4.  Symptoms (eg cough, shortness of breath) have improved      Sincerely,          Berlin Henderson MD

## 2022-01-04 NOTE — Clinical Note
[address]  [facility] EMERGENCY DEPT  [address] [facility] [id number] [phone]    Work/School Note    Date: 1/4/2022     To Whom It May concern:    Dara Méndez was evaluated by the following provider(s):  Attending Provider: Gregory Constantino MD.   Alric Hun virus is suspected. Per the CDC guidelines we recommend home isolation until the following conditions are all met:    1. At least five days have passed since symptoms first appeared and/or had a close exposure,   2. After home isolation for five days, wearing a mask around others for the next five days,  3. At least 24 have passed since last fever without the use of fever-reducing medications and  4.  Symptoms (eg cough, shortness of breath) have improved      Sincerely,          Mariann Meehan MD

## 2022-01-05 ENCOUNTER — PATIENT OUTREACH (OUTPATIENT)
Dept: CASE MANAGEMENT | Age: 32
End: 2022-01-05

## 2022-01-05 NOTE — PROGRESS NOTES
Patient contacted regarding COVID-19 diagnosis. Discussed COVID-19 related testing which was available at this time. Test results were positive. Patient informed of results, if available? yes. Ambulatory Care Manager contacted the patient by telephone to perform post discharge assessment. Call within 2 business days of discharge: Yes Verified name and  with patient as identifiers. Provided introduction to self, and explanation of the CTN/ACM role, and reason for call due to risk factors for infection and/or exposure to COVID-19. Symptoms reviewed with patient who verbalized the following symptoms: no new symptoms, no worsening symptoms and patient states he is feeling \"good\" and having mild symptoms today. Due to no new or worsening symptoms encounter was not routed to provider for escalation. Discussed follow-up appointments. If no appointment was previously scheduled, appointment scheduling offered:  Encouraged patient to follow up with PCP. Interventions to address risk factors: stay hydrated and treat symptoms as needed. Advance Care Planning:   Does patient have an Advance Directive: not on file. Educated patient about risk for severe COVID-19 due to risk factors according to CDC guidelines. ACM reviewed discharge instructions, medical action plan and red flag symptoms with the patient who verbalized understanding. Discussed COVID vaccination status: yes. Education provided on COVID-19 vaccination as appropriate. Discussed exposure protocols and quarantine with CDC Guidelines. Patient was given an opportunity to verbalize any questions and concerns and agrees to contact ACM or health care provider for questions related to their healthcare. Reviewed and educated patient on any new and changed medications related to discharge diagnosis     Was patient discharged with a pulse oximeter? no     ACM provided contact information.  Plan for follow-up call in 5-7 days based on severity of symptoms and risk factors.

## 2022-01-12 ENCOUNTER — PATIENT OUTREACH (OUTPATIENT)
Dept: CASE MANAGEMENT | Age: 32
End: 2022-01-12

## 2022-02-25 ENCOUNTER — HOSPITAL ENCOUNTER (EMERGENCY)
Age: 32
Discharge: HOME | End: 2022-02-25
Attending: EMERGENCY MEDICINE

## 2022-02-25 VITALS
HEIGHT: 70 IN | SYSTOLIC BLOOD PRESSURE: 127 MMHG | HEART RATE: 69 BPM | DIASTOLIC BLOOD PRESSURE: 85 MMHG | OXYGEN SATURATION: 98 % | WEIGHT: 197 LBS | BODY MASS INDEX: 28.2 KG/M2 | RESPIRATION RATE: 16 BRPM | TEMPERATURE: 98.1 F

## 2022-02-25 DIAGNOSIS — R42 DIZZINESS: Primary | ICD-10-CM

## 2022-02-25 LAB
ATRIAL RATE: 60 BPM
CALCULATED P AXIS, ECG09: 60 DEGREES
CALCULATED R AXIS, ECG10: 21 DEGREES
CALCULATED T AXIS, ECG11: 5 DEGREES
DIAGNOSIS, 93000: NORMAL
GLUCOSE BLD STRIP.AUTO-MCNC: 126 MG/DL (ref 65–117)
P-R INTERVAL, ECG05: 140 MS
Q-T INTERVAL, ECG07: 400 MS
QRS DURATION, ECG06: 90 MS
QTC CALCULATION (BEZET), ECG08: 400 MS
SERVICE CMNT-IMP: ABNORMAL
VENTRICULAR RATE, ECG03: 60 BPM

## 2022-02-25 PROCEDURE — 99284 EMERGENCY DEPT VISIT MOD MDM: CPT

## 2022-02-25 PROCEDURE — 93005 ELECTROCARDIOGRAM TRACING: CPT

## 2022-02-25 PROCEDURE — 82962 GLUCOSE BLOOD TEST: CPT

## 2022-02-25 NOTE — ED NOTES
Pt reports to ER w/ lightheadedness, dizziness and sweating x 1.5 days. Pt reports mild left sided abdominal pain as well. No meds taken PTA. Denies cough, sore throat, N/V/D. Alert and oriented x4. Skin warm dry and intact. Ambulates independently. Emergency Department Nursing Plan of Care       The Nursing Plan of Care is developed from the Nursing assessment and Emergency Department Attending provider initial evaluation. The plan of care may be reviewed in the ED Provider note.     The Plan of Care was developed with the following considerations:   Patient / Family readiness to learn indicated by:verbalized understanding  Persons(s) to be included in education: patient  Barriers to Learning/Limitations:No    Signed     Nighat Sainz    2/25/2022   5:43 AM

## 2022-02-25 NOTE — ED PROVIDER NOTES
28-year-old male presents ambulatory to the ED with chief complaint of \"getting a little dizzy and sweating. \"  Patient states \"I had Covid twice and I want to make sure I do not have it now. \"  Patient has not been vaccinated. Patient describes the dizziness as a lightheadedness and states that on and off for couple days. He states this morning he got up and felt a little dizzy and was sweating and feeling hot so he came right to the ED. On review of systems he says he has abdominal pain and points to his left upper quadrant and rates it a \"8 out of 10.\"  On review of systems admits to shortness of breath and mild headache, but hypothesized that shortness of breath might be related to mass. Also on review of systems admits to sleeping less. Denies cough, sore throat, vomiting, diarrhea, dysuria, rectal bleeding. Patient states he has been eating and drinking okay. Denies any change in activity. It should be noted that we had a recent warm spell with temperatures near 70 degrees  When he felt dizzy this morning he did not  try over-the-counter medications. States he was drinking Gatorade some yesterday. History reviewed. No pertinent past medical history. History reviewed. No pertinent surgical history. History reviewed. No pertinent family history.     Social History     Socioeconomic History    Marital status: SINGLE     Spouse name: Not on file    Number of children: Not on file    Years of education: Not on file    Highest education level: Not on file   Occupational History    Not on file   Tobacco Use    Smoking status: Never Smoker    Smokeless tobacco: Never Used   Substance and Sexual Activity    Alcohol use: Yes     Comment: rarely    Drug use: Yes     Types: Marijuana     Comment: 2-4 \"blunts\" a day     Sexual activity: Yes     Partners: Female     Birth control/protection: Condom   Other Topics Concern    Not on file   Social History Narrative    Not on file     Social Determinants of Health     Financial Resource Strain:     Difficulty of Paying Living Expenses: Not on file   Food Insecurity:     Worried About Running Out of Food in the Last Year: Not on file    Neda of Food in the Last Year: Not on file   Transportation Needs:     Lack of Transportation (Medical): Not on file    Lack of Transportation (Non-Medical): Not on file   Physical Activity:     Days of Exercise per Week: Not on file    Minutes of Exercise per Session: Not on file   Stress:     Feeling of Stress : Not on file   Social Connections:     Frequency of Communication with Friends and Family: Not on file    Frequency of Social Gatherings with Friends and Family: Not on file    Attends [employer] Services: Not on file    Active Member of [address] [place] [address] [employer] or Organizations: Not on file    Attends Club or Organization Meetings: Not on file    Marital Status: Not on file   Intimate Partner Violence:     Fear of Current or Ex-Partner: Not on file    Emotionally Abused: Not on file    Physically Abused: Not on file    Sexually Abused: Not on file   Housing Stability:     Unable to Pay for Housing in the Last Year: Not on file    Number of [place] in the Last Year: Not on file    Unstable Housing in the Last Year: Not on file         ALLERGIES: Patient has no known allergies. Review of Systems   Constitutional: Negative. Negative for fever. HENT: Negative. Negative for drooling, facial swelling and trouble swallowing. Eyes: Negative. Negative for discharge and redness. Respiratory: Positive for shortness of breath. Negative for chest tightness and wheezing. Cardiovascular: Negative. Negative for chest pain. Gastrointestinal: Positive for abdominal pain. Negative for abdominal distention, constipation, diarrhea, nausea and vomiting. Endocrine: Negative. Genitourinary: Negative. Negative for difficulty urinating and dysuria. Musculoskeletal: Negative.   Negative for arthralgias and myalgias. Skin: Negative. Negative for color change and rash. Allergic/Immunologic: Negative. Neurological: Positive for dizziness and headaches. Negative for syncope, facial asymmetry and speech difficulty. Hematological: Negative. Psychiatric/Behavioral: Negative for agitation and confusion. The patient is nervous/anxious. All other systems reviewed and are negative. Vitals:    02/25/22 0536   BP: (!) 141/79   Pulse: 72   Resp: 16   Temp: 98.1 °F (36.7 °C)   SpO2: 98%   Weight: 89.4 kg (197 lb)   Height: 5' 10\" (1.778 m)            Physical Exam  Vitals and nursing note reviewed. Constitutional:       General: He is not in acute distress. Appearance: Normal appearance. He is well-developed. He is not ill-appearing, toxic-appearing or diaphoretic. Comments: Patient sits up easily without apparent distress at the beginning of exam   HENT:      Head: Normocephalic and atraumatic. Eyes:      Conjunctiva/sclera: Conjunctivae normal.   Cardiovascular:      Rate and Rhythm: Normal rate and regular rhythm. Pulmonary:      Effort: Pulmonary effort is normal. No accessory muscle usage or respiratory distress. Breath sounds: Normal breath sounds. No wheezing or rales. Abdominal:      Palpations: Abdomen is soft. Tenderness: There is no abdominal tenderness. Comments: Abdomen is soft without reducible tenderness. No guarding or rebound. Musculoskeletal:         General: Normal range of motion. Cervical back: Neck supple. Skin:     General: Skin is warm and dry. Comments: Patient not currently diaphoretic. States it went away when he entered the ED. Neurological:      Mental Status: He is alert and oriented to person, place, and time. Psychiatric:         Behavior: Behavior normal.         Thought Content: Thought content normal.          MDM  Number of Diagnoses or Management Options  Lightheadedness  Diagnosis management comments:  Well appearing, afebrile, with vague symptoms and normal exam without reproducible abdominal pain. Currently biggest concern is his mask being uncomfortable. Differential diagnosis: Viral syndrome, anxiety, mild dehydration, sleep deprivation, weather change affect, gastritis, GERD, URI, fear of covid, hypo/hyperglycemia, orthostatic hypotension. Less likely: Covid, dysrhythmia, pneumonia      ED Course as of 02/25/22 0604   Fri Feb 25, 2022   0603 Orthostatics negative, EKG normal, Accu-Chek 126. Patient was informed that he would be referred to outpatient testing sites for Covid. He walked out before he was given a p.o. challenge. [SS]      ED Course User Index  [SS] Radha Alvarado MD       Procedures    EKG done at 557: Normal sinus rhythm, rate 60, normal axis, normal intervals, no ectopy, ST morphology shows J-point elevation. No ischemic change. LABORATORY TESTS:  Recent Results (from the past 12 hour(s))   GLUCOSE, POC    Collection Time: 02/25/22  5:55 AM   Result Value Ref Range    Glucose (POC) 126 (H) 65 - 117 mg/dL    Performed by Nelida Jha RN    EKG, 12 LEAD, INITIAL    Collection Time: 02/25/22  5:57 AM   Result Value Ref Range    Ventricular Rate 60 BPM    Atrial Rate 60 BPM    P-R Interval 140 ms    QRS Duration 90 ms    Q-T Interval 400 ms    QTC Calculation (Bezet) 400 ms    Calculated P Axis 60 degrees    Calculated R Axis 21 degrees    Calculated T Axis 5 degrees    Diagnosis Normal sinus rhythm  No previous ECGs available          IMAGING RESULTS:  No orders to display       MEDICATIONS GIVEN:  Medications - No data to display    IMPRESSION:  1. Lightheadedness        PLAN:  1. There are no discharge medications for this patient.     2.   Follow-up Information    None       Return to ED if worse
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
